# Patient Record
Sex: MALE | Race: WHITE | NOT HISPANIC OR LATINO | Employment: OTHER | URBAN - METROPOLITAN AREA
[De-identification: names, ages, dates, MRNs, and addresses within clinical notes are randomized per-mention and may not be internally consistent; named-entity substitution may affect disease eponyms.]

---

## 2017-04-19 ENCOUNTER — TRANSCRIBE ORDERS (OUTPATIENT)
Dept: ADMINISTRATIVE | Facility: HOSPITAL | Age: 67
End: 2017-04-19

## 2017-04-19 ENCOUNTER — HOSPITAL ENCOUNTER (OUTPATIENT)
Dept: RADIOLOGY | Facility: HOSPITAL | Age: 67
Discharge: HOME | End: 2017-04-19
Attending: INTERNAL MEDICINE
Payer: MEDICARE

## 2017-04-19 DIAGNOSIS — R05.9 COUGH: ICD-10-CM

## 2017-04-19 DIAGNOSIS — R06.00 DYSPNEA, UNSPECIFIED TYPE: Primary | ICD-10-CM

## 2017-04-19 DIAGNOSIS — R06.00 DYSPNEA, UNSPECIFIED TYPE: ICD-10-CM

## 2017-04-19 PROCEDURE — 71020 HB CHEST X-RAY 2VW FRONTAL&LATL: CPT

## 2017-04-24 ENCOUNTER — ALLSCRIPTS OFFICE VISIT (OUTPATIENT)
Dept: OTHER | Facility: OTHER | Age: 67
End: 2017-04-24

## 2017-06-01 ENCOUNTER — ALLSCRIPTS OFFICE VISIT (OUTPATIENT)
Dept: OTHER | Facility: OTHER | Age: 67
End: 2017-06-01

## 2017-10-24 ENCOUNTER — ALLSCRIPTS OFFICE VISIT (OUTPATIENT)
Dept: OTHER | Facility: OTHER | Age: 67
End: 2017-10-24

## 2017-10-25 NOTE — PROGRESS NOTES
Assessment  1  Acute bronchitis (466 0) (J20 9)   2  Cough (786 2) (R05)   3  Allergic rhinitis (477 9) (J30 9)    Plan  Allergic rhinitis    · Follow-up visit in 1 month Evaluation and Treatment  Follow-up  Status: Hold For -  Scheduling  Requested for: 47VWG8403   Ordered; For: Allergic rhinitis; Ordered By: Lisa Orona Performed:  Due: 59UOM7696    Discussion/Summary  Discussion Summary:   The patient has acute bronchitis  It is the cause of the cough  I have suggested to the patient to take Robitussin over-the-counter  I told him I would like to avoid any narcotics like codeine at the time  I have also started him on Breo 100 1 inhalation once a day  I have provided him with samples and showed him how to use the left to device appropriately  I have reminded him that he should rinse his mouth with water after each use  He will continue with the nasal and sinus saline lavaged  He will restart the fluticasone nasal spray 2 sprays to each nostril once a day  If his symptoms worsen or if he develops a fever he will call my office  Otherwise I will see him in 1 month in a follow-up visit  Goals and Barriers: The patient has the current Goals: Get over the acute bronchitis  The patent has the current Barriers: Medications cost  Patient is in the donut hole  Patient's Capacity to Self-Care: Patient is able to Self-Care  Patient agrees and allows to involve family/caregiver in development of care plan:   Patient Education: Educational resources provided:   Medication SE Review and Pt Understands Tx: The treatment plan was reviewed with the patient/guardian  The patient/guardian understands and agrees with the treatment plan      Active Problems  1  Back Pain   2  Benign prostatic hypertrophy (600 00) (N40 0)   3  Chronic cough (786 2) (R05)   4  Chronic rhinitis (472 0) (J31 0)   5  Closed fracture of multiple ribs of left side, sequela (905 1) (S22 42XS)   6   Dysfunction of both eustachian tubes (381 81) (H69 83)   7  Hallux Rigidus Of The Left First Toe (735 2)   8  Hearing Loss (389 9)   9  Hernia (553 9) (K46 9)   10  Hypertension (401 9) (I10)   11  Nephrolithiasis (592 0) (N20 0)   12  Osteoarthritis (715 90) (M19 90)    History of Present Illness  HPI: The patient is here for a sick visit  He was not [place] for about a week and return back about 3 days ago  The 2nd day he developed upper respiratory infection and felt sick  He developed a cough producing clear sputum  Also developed congestion of his sinus and nose  He denied fever or chills  No chest pain  Yesterday his symptoms were worse however today started feeling better  He slept in the recliner for 2 nights  his last visit he stopped using the Symbicort and his hoarseness of the voiced resolved  The cough did not come back after he stopped the Symbicort  He also stopped using the fluticasone nasal spray  However he continue to use the Zyrtec  Review of Systems  Complete-Male Pulm:   Constitutional: No fever or chills, feels well, no tiredness, no recent weight gain or weight loss  Eyes: no complaints of vision problems  ENT: no rhinitis, no PND, no epistaxis  Cardiovascular: no palpitations, no chest pain  Respiratory: as noted in HPI  Gastrointestinal: no complaints of esophageal reflux, no abdominal pain  Genitourinary: no urinary retention  Musculoskeletal: no arthralgias, no joint swelling, no myalgias  Integumentary: no rash, no lesions  Neurological: no headache, no fainting, no weakness  Psychiatric: no anxiety, no depression  Hematologic/Lymphatic: no complaints of swollen glands  Past Medical History  1  History of Back problem (724 9) (M53 9)   2  History of Nephrolithiasis (V13 01)   3  History of Renal cyst, acquired (593 2) (N28 1)    Surgical History  1  History of Cystoscopy With Removal Of Object   2  History of Hernia Repair  Surgical History Reviewed:    The surgical history was reviewed and updated today  Family History  Mother    1  Family history of Heart disease (429 9) (I51 9)   2  Family history of Hypertension (401 9) (I10)  Father    3  Family history of malignant neoplasm (V16 9) (Z80 9)   4  Family history of Heart disease (429 9) (I51 9)   5  Family history of Hypertension (401 9) (I10)  Family History Reviewed: The family history was reviewed and updated today  Social History   · History of Being A Social Drinker   · Denied: Drug use (305 90) (F19 90)   · Former smoker (V15 82) (X75 548)   · Pt stated smoke 1 1/2 packs per day for 20 yrs and quit in [facility] 3970 History Reviewed: The social history was reviewed and updated today  Current Meds   1  Eliquis 5 MG Oral Tablet Recorded   2  Finasteride 5 MG Oral Tablet; Take 1 tablet daily; Therapy: 57OAG6981 to ([facility] [place])  Requested for: 38PGS1711; Last   Rx:17Tej4153 Ordered   3  Losartan Potassium 50 MG Oral Tablet; Take 1 tablet by mouth  daily; Therapy: 32SKH0847 to (Evaluate:12Jan2015)  Requested for: 24BGF5399; Last   Rx:01Wks8128 Ordered   4  Symbicort 160-4 5 MCG/ACT Inhalation Aerosol; INHALE 2 PUFFS TWICE DAILY  RINSE   MOUTH AFTER USE; Therapy: 84CGH4164 to (Last Rx:47Rja9486)  Requested for: 55Lwc8309 Ordered   5  Tamsulosin HCl - 0 4 MG Oral Capsule; Take 1 capsule by mouth  twice a day; Therapy: 32BIB2778 to (Evaluate:12Jan2015)  Requested for: 12SJA1214; Last   Rx:21Nby0202 Ordered  Medication List Reviewed: The medication list was reviewed and updated today  Allergies  1  No Known Drug Allergies    Vitals  Vital Signs    Recorded: 21UQM2698 03:13PM   Temperature 98 8 F   Heart Rate 85   Respiration 16   Systolic 925   Diastolic 80   Height 5 ft 9 in   Weight 250 lb    BMI Calculated 36 92   BSA Calculated 2 27   O2 Saturation 96, RA     Physical Exam    Constitutional   General appearance: No acute distress, well appearing and well nourished      Ears, Nose, Mouth, and Throat   Nasal mucosa, septum, and turbinates: Normal without edema or erythema  Lips, teeth, and gums: Normal, good dentition  Oropharynx: Normal with no erythema, edema, exudate or lesions  Neck   Neck: Supple, symmetric, trachea midline, no masses  Jugular veins: Normal     Pulmonary   Auscultation of lungs: Abnormal  -- Coarse breath sounds  No wheezing  Cardiovascular   Auscultation of heart: Normal rate and rhythm, normal S1 and S2, no murmurs  Examination of extremities for edema and/or varicosities: Normal     Abdomen   Abdomen: Soft, non-tender  Lymphatic   Palpation of lymph nodes in neck: No lymphadenopathy  Musculoskeletal   Gait and station: Normal     Digits and nails: Normal without clubbing or cyanosis  Neurologic   Mental Status: Normal  Not confused, no evidence of dementia, good comprehension, good concentration  Skin   Skin and subcutaneous tissue: Limited exam shows no rash  Psychiatric   Orientation to person, place and time: Normal     Mood and affect: Normal        Thank you very much for allowing me to participate in the care of this patient  If you have any questions, please do not hesitate to contact me  Future Appointments    Date/Time Provider Specialty Site   11/14/2017 01:20 PM Eliz Guerrero MD Pulmonary Medicine [facility]     Signatures   Electronically signed by :  Tyree Huff MD; Oct 24 2017  4:45PM EST                       (Author)

## 2017-11-07 ENCOUNTER — GENERIC CONVERSION - ENCOUNTER (OUTPATIENT)
Dept: OTHER | Facility: OTHER | Age: 67
End: 2017-11-07

## 2017-11-14 ENCOUNTER — ALLSCRIPTS OFFICE VISIT (OUTPATIENT)
Dept: OTHER | Facility: OTHER | Age: 67
End: 2017-11-14

## 2017-11-14 ENCOUNTER — GENERIC CONVERSION - ENCOUNTER (OUTPATIENT)
Dept: OTHER | Facility: OTHER | Age: 67
End: 2017-11-14

## 2018-01-13 VITALS
HEIGHT: 69 IN | BODY MASS INDEX: 37.03 KG/M2 | WEIGHT: 250 LBS | SYSTOLIC BLOOD PRESSURE: 140 MMHG | HEART RATE: 85 BPM | DIASTOLIC BLOOD PRESSURE: 80 MMHG | TEMPERATURE: 98.8 F | RESPIRATION RATE: 16 BRPM | OXYGEN SATURATION: 96 %

## 2018-01-14 VITALS
HEART RATE: 90 BPM | SYSTOLIC BLOOD PRESSURE: 148 MMHG | BODY MASS INDEX: 36.43 KG/M2 | DIASTOLIC BLOOD PRESSURE: 90 MMHG | RESPIRATION RATE: 18 BRPM | TEMPERATURE: 97.7 F | HEIGHT: 69 IN | WEIGHT: 246 LBS | OXYGEN SATURATION: 95 %

## 2018-01-15 VITALS
SYSTOLIC BLOOD PRESSURE: 120 MMHG | HEART RATE: 80 BPM | HEIGHT: 69 IN | DIASTOLIC BLOOD PRESSURE: 72 MMHG | OXYGEN SATURATION: 95 % | RESPIRATION RATE: 16 BRPM | WEIGHT: 226 LBS | TEMPERATURE: 97.9 F | BODY MASS INDEX: 33.47 KG/M2

## 2018-01-22 VITALS
RESPIRATION RATE: 18 BRPM | OXYGEN SATURATION: 95 % | TEMPERATURE: 97.6 F | HEIGHT: 69 IN | WEIGHT: 244 LBS | BODY MASS INDEX: 36.14 KG/M2 | SYSTOLIC BLOOD PRESSURE: 170 MMHG | DIASTOLIC BLOOD PRESSURE: 90 MMHG | HEART RATE: 110 BPM

## 2018-01-22 VITALS
RESPIRATION RATE: 18 BRPM | TEMPERATURE: 98.2 F | OXYGEN SATURATION: 94 % | WEIGHT: 248.13 LBS | HEIGHT: 69 IN | HEART RATE: 92 BPM | BODY MASS INDEX: 36.75 KG/M2 | SYSTOLIC BLOOD PRESSURE: 160 MMHG | DIASTOLIC BLOOD PRESSURE: 98 MMHG

## 2018-03-19 DIAGNOSIS — R05.9 COUGH: Primary | ICD-10-CM

## 2018-03-19 NOTE — TELEPHONE ENCOUNTER
Pt called requesting refills for Breo to be sent to [facility] x 90days  The medicine is not in his Med list? Please check w/ Dr Karina Jewell if patient should follow up since on his last visit Dr Karina Jewell mentioned pt should stop Breo  Pt is aware he might need an appt

## 2018-03-22 RX ORDER — FLUTICASONE FUROATE AND VILANTEROL 100; 25 UG/1; UG/1
1 POWDER RESPIRATORY (INHALATION) DAILY
Qty: 3 EACH | Refills: 3 | Status: SHIPPED | OUTPATIENT
Start: 2018-03-22 | End: 2019-03-22

## 2018-04-25 ENCOUNTER — HOSPITAL ENCOUNTER (OUTPATIENT)
Facility: HOSPITAL | Age: 68
Setting detail: OBSERVATION
Discharge: HOME | End: 2018-04-26
Attending: EMERGENCY MEDICINE | Admitting: INTERNAL MEDICINE
Payer: MEDICARE

## 2018-04-25 ENCOUNTER — APPOINTMENT (EMERGENCY)
Dept: RADIOLOGY | Facility: HOSPITAL | Age: 68
End: 2018-04-25
Payer: MEDICARE

## 2018-04-25 ENCOUNTER — APPOINTMENT (OUTPATIENT)
Dept: CT IMAGING | Facility: HOSPITAL | Age: 68
End: 2018-04-25
Payer: MEDICARE

## 2018-04-25 DIAGNOSIS — F51.01 PRIMARY INSOMNIA: ICD-10-CM

## 2018-04-25 DIAGNOSIS — I10 UNCONTROLLED HYPERTENSION: Primary | ICD-10-CM

## 2018-04-25 PROBLEM — D68.51 FACTOR V LEIDEN MUTATION (HCC): Chronic | Status: ACTIVE | Noted: 2018-04-25

## 2018-04-25 PROBLEM — Z79.01 CURRENT USE OF LONG TERM ANTICOAGULATION: Chronic | Status: ACTIVE | Noted: 2018-04-25

## 2018-04-25 PROBLEM — K46.9 ABDOMINAL HERNIA: Status: ACTIVE | Noted: 2018-04-25

## 2018-04-25 LAB
ALBUMIN SERPL BCP-MCNC: 3.7 G/DL (ref 3.5–5)
ALP SERPL-CCNC: 120 U/L (ref 46–116)
ALT SERPL W P-5'-P-CCNC: 36 U/L (ref 12–78)
ANION GAP SERPL CALCULATED.3IONS-SCNC: 7 MMOL/L (ref 4–13)
AST SERPL W P-5'-P-CCNC: 23 U/L (ref 5–45)
ATRIAL RATE: 79 BPM
BACTERIA UR QL AUTO: ABNORMAL /HPF
BASOPHILS # BLD AUTO: 0.04 THOUSANDS/ΜL (ref 0–0.1)
BASOPHILS NFR BLD AUTO: 0 % (ref 0–1)
BILIRUB SERPL-MCNC: 0.47 MG/DL (ref 0.2–1)
BILIRUB UR QL STRIP: NEGATIVE
BUN SERPL-MCNC: 18 MG/DL (ref 5–25)
CALCIUM SERPL-MCNC: 9.8 MG/DL (ref 8.3–10.1)
CHLORIDE SERPL-SCNC: 104 MMOL/L (ref 100–108)
CLARITY UR: CLEAR
CO2 SERPL-SCNC: 29 MMOL/L (ref 21–32)
COLOR UR: YELLOW
CREAT SERPL-MCNC: 0.97 MG/DL (ref 0.6–1.3)
EOSINOPHIL # BLD AUTO: 0.36 THOUSAND/ΜL (ref 0–0.61)
EOSINOPHIL NFR BLD AUTO: 3 % (ref 0–6)
ERYTHROCYTE [DISTWIDTH] IN BLOOD BY AUTOMATED COUNT: 13.8 % (ref 11.6–15.1)
GFR SERPL CREATININE-BSD FRML MDRD: 79 ML/MIN/1.73SQ M
GLUCOSE SERPL-MCNC: 93 MG/DL (ref 65–140)
GLUCOSE UR STRIP-MCNC: NEGATIVE MG/DL
HCT VFR BLD AUTO: 49.4 % (ref 36.5–49.3)
HGB BLD-MCNC: 16.4 G/DL (ref 12–17)
HGB UR QL STRIP.AUTO: ABNORMAL
KETONES UR STRIP-MCNC: NEGATIVE MG/DL
LEUKOCYTE ESTERASE UR QL STRIP: ABNORMAL
LYMPHOCYTES # BLD AUTO: 2.03 THOUSANDS/ΜL (ref 0.6–4.47)
LYMPHOCYTES NFR BLD AUTO: 19 % (ref 14–44)
MAGNESIUM SERPL-MCNC: 2 MG/DL (ref 1.6–2.6)
MCH RBC QN AUTO: 31.2 PG (ref 26.8–34.3)
MCHC RBC AUTO-ENTMCNC: 33.2 G/DL (ref 31.4–37.4)
MCV RBC AUTO: 94 FL (ref 82–98)
MONOCYTES # BLD AUTO: 0.78 THOUSAND/ΜL (ref 0.17–1.22)
MONOCYTES NFR BLD AUTO: 8 % (ref 4–12)
NEUTROPHILS # BLD AUTO: 7.23 THOUSANDS/ΜL (ref 1.85–7.62)
NEUTS SEG NFR BLD AUTO: 70 % (ref 43–75)
NITRITE UR QL STRIP: NEGATIVE
NON-SQ EPI CELLS URNS QL MICRO: ABNORMAL /HPF
NRBC BLD AUTO-RTO: 0 /100 WBCS
P AXIS: 40 DEGREES
PH UR STRIP.AUTO: 7 [PH] (ref 4.5–8)
PLATELET # BLD AUTO: 265 THOUSANDS/UL (ref 149–390)
PMV BLD AUTO: 9.9 FL (ref 8.9–12.7)
POTASSIUM SERPL-SCNC: 4.3 MMOL/L (ref 3.5–5.3)
PR INTERVAL: 156 MS
PROT SERPL-MCNC: 7.7 G/DL (ref 6.4–8.2)
PROT UR STRIP-MCNC: NEGATIVE MG/DL
QRS AXIS: -28 DEGREES
QRSD INTERVAL: 90 MS
QT INTERVAL: 364 MS
QTC INTERVAL: 417 MS
RBC # BLD AUTO: 5.26 MILLION/UL (ref 3.88–5.62)
RBC #/AREA URNS AUTO: ABNORMAL /HPF
SODIUM SERPL-SCNC: 140 MMOL/L (ref 136–145)
SP GR UR STRIP.AUTO: 1.01 (ref 1–1.03)
T WAVE AXIS: 35 DEGREES
TROPONIN I SERPL-MCNC: <0.02 NG/ML
TSH SERPL DL<=0.05 MIU/L-ACNC: 5.52 UIU/ML (ref 0.36–3.74)
UROBILINOGEN UR QL STRIP.AUTO: 0.2 E.U./DL
VENTRICULAR RATE: 79 BPM
WBC # BLD AUTO: 10.44 THOUSAND/UL (ref 4.31–10.16)
WBC #/AREA URNS AUTO: ABNORMAL /HPF

## 2018-04-25 PROCEDURE — 80053 COMPREHEN METABOLIC PANEL: CPT | Performed by: EMERGENCY MEDICINE

## 2018-04-25 PROCEDURE — 36415 COLL VENOUS BLD VENIPUNCTURE: CPT | Performed by: EMERGENCY MEDICINE

## 2018-04-25 PROCEDURE — 96374 THER/PROPH/DIAG INJ IV PUSH: CPT

## 2018-04-25 PROCEDURE — 99284 EMERGENCY DEPT VISIT MOD MDM: CPT

## 2018-04-25 PROCEDURE — 83735 ASSAY OF MAGNESIUM: CPT | Performed by: EMERGENCY MEDICINE

## 2018-04-25 PROCEDURE — 70450 CT HEAD/BRAIN W/O DYE: CPT

## 2018-04-25 PROCEDURE — 96361 HYDRATE IV INFUSION ADD-ON: CPT

## 2018-04-25 PROCEDURE — 81002 URINALYSIS NONAUTO W/O SCOPE: CPT | Performed by: EMERGENCY MEDICINE

## 2018-04-25 PROCEDURE — 81001 URINALYSIS AUTO W/SCOPE: CPT

## 2018-04-25 PROCEDURE — 85025 COMPLETE CBC W/AUTO DIFF WBC: CPT | Performed by: EMERGENCY MEDICINE

## 2018-04-25 PROCEDURE — 84443 ASSAY THYROID STIM HORMONE: CPT | Performed by: EMERGENCY MEDICINE

## 2018-04-25 PROCEDURE — 96375 TX/PRO/DX INJ NEW DRUG ADDON: CPT

## 2018-04-25 PROCEDURE — 84484 ASSAY OF TROPONIN QUANT: CPT | Performed by: EMERGENCY MEDICINE

## 2018-04-25 PROCEDURE — 93010 ELECTROCARDIOGRAM REPORT: CPT | Performed by: INTERNAL MEDICINE

## 2018-04-25 PROCEDURE — 93005 ELECTROCARDIOGRAM TRACING: CPT

## 2018-04-25 PROCEDURE — 71046 X-RAY EXAM CHEST 2 VIEWS: CPT

## 2018-04-25 RX ORDER — LOSARTAN POTASSIUM 50 MG/1
100 TABLET ORAL DAILY
Status: DISCONTINUED | OUTPATIENT
Start: 2018-04-26 | End: 2018-04-26 | Stop reason: HOSPADM

## 2018-04-25 RX ORDER — VALACYCLOVIR HYDROCHLORIDE 500 MG/1
1000 TABLET, FILM COATED ORAL 3 TIMES DAILY
Status: DISCONTINUED | OUTPATIENT
Start: 2018-04-25 | End: 2018-04-26 | Stop reason: HOSPADM

## 2018-04-25 RX ORDER — FLUTICASONE FUROATE AND VILANTEROL 100; 25 UG/1; UG/1
1 POWDER RESPIRATORY (INHALATION) DAILY
Status: DISCONTINUED | OUTPATIENT
Start: 2018-04-26 | End: 2018-04-26 | Stop reason: HOSPADM

## 2018-04-25 RX ORDER — LORAZEPAM 0.5 MG/1
0.5 TABLET ORAL EVERY 8 HOURS PRN
Status: DISCONTINUED | OUTPATIENT
Start: 2018-04-25 | End: 2018-04-26 | Stop reason: HOSPADM

## 2018-04-25 RX ORDER — LABETALOL HYDROCHLORIDE 5 MG/ML
10 INJECTION, SOLUTION INTRAVENOUS ONCE
Status: COMPLETED | OUTPATIENT
Start: 2018-04-25 | End: 2018-04-25

## 2018-04-25 RX ORDER — TAMSULOSIN HYDROCHLORIDE 0.4 MG/1
0.4 CAPSULE ORAL DAILY
Status: DISCONTINUED | OUTPATIENT
Start: 2018-04-25 | End: 2018-04-26 | Stop reason: HOSPADM

## 2018-04-25 RX ORDER — HYDRALAZINE HYDROCHLORIDE 20 MG/ML
5 INJECTION INTRAMUSCULAR; INTRAVENOUS EVERY 4 HOURS PRN
Status: DISCONTINUED | OUTPATIENT
Start: 2018-04-25 | End: 2018-04-26 | Stop reason: HOSPADM

## 2018-04-25 RX ORDER — CHLORTHALIDONE 25 MG/1
25 TABLET ORAL DAILY
Status: DISCONTINUED | OUTPATIENT
Start: 2018-04-25 | End: 2018-04-26 | Stop reason: HOSPADM

## 2018-04-25 RX ORDER — HYDRALAZINE HYDROCHLORIDE 20 MG/ML
10 INJECTION INTRAMUSCULAR; INTRAVENOUS ONCE
Status: COMPLETED | OUTPATIENT
Start: 2018-04-25 | End: 2018-04-25

## 2018-04-25 RX ORDER — FINASTERIDE 5 MG/1
5 TABLET, FILM COATED ORAL DAILY
Status: DISCONTINUED | OUTPATIENT
Start: 2018-04-25 | End: 2018-04-26 | Stop reason: HOSPADM

## 2018-04-25 RX ORDER — VALACYCLOVIR HYDROCHLORIDE 1 G/1
1000 TABLET, FILM COATED ORAL 3 TIMES DAILY
COMMUNITY
End: 2019-03-22

## 2018-04-25 RX ORDER — LOSARTAN POTASSIUM 50 MG/1
50 TABLET ORAL DAILY
Status: DISCONTINUED | OUTPATIENT
Start: 2018-04-26 | End: 2018-04-25

## 2018-04-25 RX ADMIN — TAMSULOSIN HYDROCHLORIDE 0.4 MG: 0.4 CAPSULE ORAL at 23:00

## 2018-04-25 RX ADMIN — CHLORTHALIDONE 25 MG: 25 TABLET ORAL at 22:54

## 2018-04-25 RX ADMIN — SODIUM CHLORIDE 1000 ML: 0.9 INJECTION, SOLUTION INTRAVENOUS at 18:22

## 2018-04-25 RX ADMIN — VALACYCLOVIR HYDROCHLORIDE 1000 MG: 500 TABLET, FILM COATED ORAL at 22:54

## 2018-04-25 RX ADMIN — APIXABAN 5 MG: 5 TABLET, FILM COATED ORAL at 22:54

## 2018-04-25 RX ADMIN — FINASTERIDE 5 MG: 5 TABLET, FILM COATED ORAL at 23:42

## 2018-04-25 RX ADMIN — HYDRALAZINE HYDROCHLORIDE 10 MG: 20 INJECTION INTRAMUSCULAR; INTRAVENOUS at 18:27

## 2018-04-25 RX ADMIN — LORAZEPAM 0.5 MG: 0.5 TABLET ORAL at 23:00

## 2018-04-25 RX ADMIN — LABETALOL 20 MG/4 ML (5 MG/ML) INTRAVENOUS SYRINGE 10 MG: at 19:24

## 2018-04-25 NOTE — ED PROVIDER NOTES
History  Chief Complaint   Patient presents with    High Blood Pressure     pt reports he was at PCP for another reason, had high blood pressure at the office was instructed to take additional dose of Losartan-reports hypertension continues  denies chest pain     69 YO male presents with high blood pressure today  Pt states he was seeing his PCP for a rash, notes to have an elevated blood pressure in the office  Pt take Valsartan for his pressure, was told to double his dose  Pt went home and did take an extra Valsartan but states his pressure continued to elevated throughout the evening  Pt denies associated lightheadedness, headaches, chest pain, shortness of breath  States he has felt fatigue for ~2 weeks  Pt denies similar issues with his pressure before  Pt denies CP/SOB/F/C/N/V/D/C, no dysuria, burning on urination or blood in urine  History provided by:  Patient   used: No    Hypertension   Severity:  Mild  Onset quality:  Unable to specify  Duration:  1 day  Timing:  Constant  Progression:  Worsening  Chronicity:  New  Relieved by:  Nothing  Worsened by:  Nothing  Associated symptoms: fatigue    Associated symptoms: no abdominal pain, no anxiety, no blurred vision, no chest pain, no confusion, no dizziness, no fever, no headaches, no nausea, no neck pain, no shortness of breath, no tinnitus, not vomiting and no weakness    Fatigue:     Severity:  Moderate    Duration:  2 weeks    Timing:  Constant    Progression:  Waxing and waning      Prior to Admission Medications   Prescriptions Last Dose Informant Patient Reported? Taking? LORazepam (ATIVAN) 0 5 mg tablet   Yes Yes   Sig: Take 0 5 mg by mouth 2 (two) times a day as needed     Naproxen Sodium (ALEVE PO)   Yes Yes   Sig: Take 1 tablet by mouth 2 (two) times a day as needed   apixaban (ELIQUIS) 5 mg   No Yes   Sig: 10 mg orally twice daily for 7 days and then 5 mg orally twice daily     Patient taking differently: 5 mg 2 (two) times a day     finasteride (PROSCAR) 5 mg tablet   Yes Yes   Sig: Take 5 mg by mouth daily  fluticasone furoate-vilanterol (BREO ELLIPTA)   No Yes   Sig: Inhale 1 puff daily   losartan (COZAAR) 50 mg tablet   Yes Yes   Sig: Take 50 mg by mouth daily  tamsulosin (FLOMAX) 0 4 mg   Yes Yes   Sig: Take 0 4 mg by mouth daily  valACYclovir (VALTREX) 1,000 mg tablet   Yes Yes   Sig: Take 1,000 mg by mouth 3 (three) times a day      Facility-Administered Medications: None       Past Medical History:   Diagnosis Date    Allergic rhinitis     Arthritis     COPD, mild (HCC)     GERD (gastroesophageal reflux disease)     History of DVT (deep vein thrombosis)     Hypertension     Kidney stones     Nephrolithiasis     Obesity (BMI 30-39  9)     Osteoarthritis     PE (pulmonary embolism) 12/26/1999    Pneumonia 1/13/2016    Prostate hypertrophy     Rib pain     7,8,9 ribs on Left chest wall with Lsided hernia    Wears glasses     reading       Past Surgical History:   Procedure Laterality Date    HERNIA REPAIR      b/l inguinal hernia with mesh    KIDNEY STONE SURGERY      KNEE ARTHROPLASTY Left     TKR    MI KNEE SCOPE,SHAVE ARTICULAR CART Right 11/8/2016    Procedure: ARTHROSCOPY KNEE WITH PARTIAL MEDIAL MENISECTOMY;  Surgeon: Carl Chi MD;  [facility];  Service: Orthopedics       Family History   Problem Relation Age of Onset    Heart disease Mother     Cancer Mother     Cancer Father      I have reviewed and agree with the history as documented  Social History   Substance Use Topics    Smoking status: Former Smoker     Packs/day: 1 00     Years: 20 00     Types: Cigarettes     Quit date: 1995    Smokeless tobacco: Never Used    Alcohol use Yes      Comment: 1 daily        Review of Systems   Constitutional: Positive for fatigue  Negative for fever  HENT: Negative for dental problem and tinnitus  Eyes: Negative for blurred vision and visual disturbance     Respiratory: Negative for shortness of breath  Cardiovascular: Negative for chest pain  Gastrointestinal: Negative for abdominal pain, nausea and vomiting  Genitourinary: Negative for dysuria and frequency  Musculoskeletal: Negative for neck pain and neck stiffness  Skin: Negative for rash  Neurological: Negative for dizziness, weakness, light-headedness and headaches  Psychiatric/Behavioral: Negative for agitation, behavioral problems and confusion  The patient is not nervous/anxious  All other systems reviewed and are negative  Physical Exam  ED Triage Vitals [04/25/18 1715]   Temperature Pulse Respirations Blood Pressure SpO2   97 5 °F (36 4 °C) 91 18 (!) 199/103 96 %      Temp Source Heart Rate Source Patient Position - Orthostatic VS BP Location FiO2 (%)   Temporal Monitor Sitting Right arm --      Pain Score       No Pain           Orthostatic Vital Signs  Vitals:    04/25/18 1912 04/25/18 1915 04/25/18 1930 04/25/18 1945   BP: (!) 180/103 (!) 195/99 (!) 189/99 (!) 186/99   Pulse: 83 82 82 86   Patient Position - Orthostatic VS: Sitting  Lying Lying       Physical Exam   Constitutional: He is oriented to person, place, and time  He appears well-developed and well-nourished  HENT:   Head: Normocephalic and atraumatic  Eyes: EOM are normal    Neck: Normal range of motion  Cardiovascular: Normal rate, regular rhythm and normal heart sounds  Pulmonary/Chest: Effort normal and breath sounds normal    Abdominal: Soft  There is no tenderness  Musculoskeletal: Normal range of motion  Neurological: He is alert and oriented to person, place, and time  Skin: Skin is warm and dry  Psychiatric: He has a normal mood and affect  His behavior is normal    Nursing note and vitals reviewed        ED Medications  Medications   sodium chloride 0 9 % bolus 1,000 mL (1,000 mL Intravenous New Bag 4/25/18 1822)   hydrALAZINE (APRESOLINE) injection 10 mg (10 mg Intravenous Given 4/25/18 1827)   labetalol (NORMODYNE) injection 10 mg (10 mg Intravenous Given 4/25/18 1924)       Diagnostic Studies  Results Reviewed     Procedure Component Value Units Date/Time    Urine Microscopic [[id number]]  (Abnormal) Collected:  04/25/18 1912    Lab Status:  Final result Specimen:  Urine from Urine, Clean Catch Updated:  04/25/18 1937     RBC, UA 0-1 (A) /hpf      WBC, UA 0-1 (A) /hpf      Epithelial Cells Occasional /hpf      Bacteria, UA Occasional /hpf     POCT urinalysis dipstick [[id number]]  (Abnormal) Resulted:  04/25/18 1912    Lab Status:  Final result Specimen:  Urine Updated:  04/25/18 1912    ED Urine Macroscopic [[id number]]  (Abnormal) Collected:  04/25/18 1912    Lab Status:  Final result Specimen:  Urine Updated:  04/25/18 1911     Color, UA Yellow     Clarity, UA Clear     pH, UA 7 0     Leukocytes, UA Trace (A)     Nitrite, UA Negative     Protein, UA Negative mg/dl      Glucose, UA Negative mg/dl      Ketones, UA Negative mg/dl      Urobilinogen, UA 0 2 E U /dl      Bilirubin, UA Negative     Blood, UA Trace (A)     Specific [place], UA 1 015    Narrative:       CLINITEK RESULT    Comprehensive metabolic panel [[id number]]  (Abnormal) Collected:  04/25/18 1823    Lab Status:  Final result Specimen:  Blood from Arm, Left Updated:  04/25/18 1905     Sodium 140 mmol/L      Potassium 4 3 mmol/L      Chloride 104 mmol/L      CO2 29 mmol/L      Anion Gap 7 mmol/L      BUN 18 mg/dL      Creatinine 0 97 mg/dL      Glucose 93 mg/dL      Calcium 9 8 mg/dL      AST 23 U/L      ALT 36 U/L      Alkaline Phosphatase 120 (H) U/L      Total Protein 7 7 g/dL      Albumin 3 7 g/dL      Total Bilirubin 0 47 mg/dL      eGFR 79 ml/min/1 73sq m     Narrative:         National Kidney Disease Education Program recommendations are as follows:  GFR calculation is accurate only with a steady state creatinine  Chronic Kidney disease less than 60 ml/min/1 73 sq  meters  Kidney failure less than 15 ml/min/1 73 sq  meters      TSH [[id number]] (Abnormal) Collected:  04/25/18 1823    Lab Status:  Final result Specimen:  Blood from Arm, Left Updated:  04/25/18 1905     TSH 3RD GENERATON 5 524 (H) uIU/mL     Narrative:         Patients undergoing fluorescein dye angiography may retain small amounts of fluorescein in the body for 48-72 hours post procedure  Samples containing fluorescein can produce falsely depressed TSH values  If the patient had this procedure,a specimen should be resubmitted post fluorescein clearance  Magnesium [[id number]]  (Normal) Collected:  04/25/18 1823    Lab Status:  Final result Specimen:  Blood from Arm, Left Updated:  04/25/18 1905     Magnesium 2 0 mg/dL     Troponin I [[id number]]  (Normal) Collected:  04/25/18 1823    Lab Status:  Final result Specimen:  Blood from Arm, Left Updated:  04/25/18 1857     Troponin I <0 02 ng/mL     Narrative:         [employer] Chemistry analyzer 99% cutoff is > 0 04 ng/mL in network labs    o cTnI 99% cutoff is useful only when applied to patients in the clinical setting of myocardial ischemia  o cTnI 99% cutoff should be interpreted in the context of clinical history, ECG findings and possibly cardiac imaging to establish correct diagnosis  o cTnI 99% cutoff may be suggestive but clearly not indicative of a coronary event without the clinical setting of myocardial ischemia      CBC and differential [[id number]]  (Abnormal) Collected:  04/25/18 1823    Lab Status:  Final result Specimen:  Blood from Arm, Left Updated:  04/25/18 1837     WBC 10 44 (H) Thousand/uL      RBC 5 26 Million/uL      Hemoglobin 16 4 g/dL      Hematocrit 49 4 (H) %      MCV 94 fL      MCH 31 2 pg      MCHC 33 2 g/dL      RDW 13 8 %      MPV 9 9 fL      Platelets 548 Thousands/uL      nRBC 0 /100 WBCs      Neutrophils Relative 70 %      Lymphocytes Relative 19 %      Monocytes Relative 8 %      Eosinophils Relative 3 %      Basophils Relative 0 %      Neutrophils Absolute 7 23 Thousands/µL      Lymphocytes Absolute 2 03 Thousands/µL      Monocytes Absolute 0 78 Thousand/µL      Eosinophils Absolute 0 36 Thousand/µL      Basophils Absolute 0 04 Thousands/µL                  XR chest 2 views   Final Result by Rahat Doe MD (04/25 1901)      No acute cardiopulmonary disease  Workstation performed: [id number]                    Procedures  ECG 12 Lead Documentation  Date/Time: 4/25/2018 7:59 PM  Performed by: Nuria Medina  Authorized by: Aidee CASTILLO     ECG reviewed by me, the ED Provider: yes    Patient location:  ED  Interpretation:     Interpretation: normal    Rate:     ECG rate:  79    ECG rate assessment: normal    Rhythm:     Rhythm: sinus rhythm    QRS:     QRS axis:  Normal    QRS intervals:  Normal  Conduction:     Conduction: normal    ST segments:     ST segments:  Normal  T waves:     T waves: normal             Phone Contacts  ED Phone Contact    ED Course  ED Course as of Apr 25 1959 Wed Apr 25, 2018 1821 Spoke with Dr Joy Phillips, pt's PCP, explaining pt's presentation to the ED  Recommends hydralazine and monitoring, can likely be discharged to follow up tomorrow  MDM  Number of Diagnoses or Management Options  Uncontrolled hypertension: new and requires workup  Diagnosis management comments: 1  High blood pressure - Pt with elevated pressure, currently taking Valsartan for this, not improved after doubling his dose  Will check ECG for ischemia, electrolytes for kidney issues, CBC  Will give Hydralazine, speak with PCP         Amount and/or Complexity of Data Reviewed  Clinical lab tests: ordered and reviewed  Tests in the radiology section of CPT®: ordered and reviewed  Obtain history from someone other than the patient: yes  Discuss the patient with other providers: yes  Independent visualization of images, tracings, or specimens: yes    Patient Progress  Patient progress: stable    CritCare Time    Disposition  Final diagnoses:   Uncontrolled hypertension Time reflects when diagnosis was documented in both MDM as applicable and the Disposition within this note     Time User Action Codes Description Comment    4/25/2018  7:55 PM [place], [address] Uncontrolled hypertension       ED Disposition     ED Disposition Condition Comment    Admit  Case was discussed with Dr Sandra Ortega and the patient's admission status was agreed to be Admission Status: observation status to the service of Dr Sandra Ortega   Follow-up Information    None       Patient's Medications   Discharge Prescriptions    No medications on file     No discharge procedures on file      ED Provider  Electronically Signed by           Burt Tracey MD  04/25/18 5908

## 2018-04-26 ENCOUNTER — HOSPITAL ENCOUNTER (EMERGENCY)
Facility: HOSPITAL | Age: 68
Discharge: HOME | End: 2018-04-26
Attending: EMERGENCY MEDICINE | Admitting: EMERGENCY MEDICINE
Payer: MEDICARE

## 2018-04-26 VITALS
OXYGEN SATURATION: 95 % | DIASTOLIC BLOOD PRESSURE: 96 MMHG | WEIGHT: 256 LBS | TEMPERATURE: 98 F | BODY MASS INDEX: 37.92 KG/M2 | RESPIRATION RATE: 18 BRPM | HEIGHT: 69 IN | SYSTOLIC BLOOD PRESSURE: 156 MMHG | HEART RATE: 84 BPM

## 2018-04-26 VITALS
TEMPERATURE: 98.4 F | SYSTOLIC BLOOD PRESSURE: 172 MMHG | OXYGEN SATURATION: 97 % | DIASTOLIC BLOOD PRESSURE: 88 MMHG | HEART RATE: 100 BPM | WEIGHT: 255.73 LBS | BODY MASS INDEX: 37.77 KG/M2 | RESPIRATION RATE: 18 BRPM

## 2018-04-26 DIAGNOSIS — I10 HYPERTENSION: Primary | ICD-10-CM

## 2018-04-26 LAB
ATRIAL RATE: 106 BPM
P AXIS: 32 DEGREES
PR INTERVAL: 160 MS
QRS AXIS: -44 DEGREES
QRSD INTERVAL: 88 MS
QT INTERVAL: 322 MS
QTC INTERVAL: 427 MS
T WAVE AXIS: 12 DEGREES
VENTRICULAR RATE: 106 BPM

## 2018-04-26 PROCEDURE — 99283 EMERGENCY DEPT VISIT LOW MDM: CPT

## 2018-04-26 PROCEDURE — 93010 ELECTROCARDIOGRAM REPORT: CPT | Performed by: INTERNAL MEDICINE

## 2018-04-26 PROCEDURE — 93005 ELECTROCARDIOGRAM TRACING: CPT

## 2018-04-26 RX ORDER — CLONIDINE HYDROCHLORIDE 0.1 MG/1
0.1 TABLET ORAL ONCE
Status: COMPLETED | OUTPATIENT
Start: 2018-04-26 | End: 2018-04-26

## 2018-04-26 RX ORDER — CHLORTHALIDONE 25 MG/1
25 TABLET ORAL DAILY
Qty: 90 TABLET | Refills: 1 | Status: SHIPPED | OUTPATIENT
Start: 2018-04-27 | End: 2022-07-28 | Stop reason: HOSPADM

## 2018-04-26 RX ORDER — LORAZEPAM 0.5 MG/1
0.5 TABLET ORAL
Qty: 7 TABLET | Refills: 0 | Status: SHIPPED | OUTPATIENT
Start: 2018-04-26 | End: 2019-03-22

## 2018-04-26 RX ORDER — LOSARTAN POTASSIUM 100 MG/1
100 TABLET ORAL DAILY
Qty: 90 TABLET | Refills: 1 | Status: SHIPPED | OUTPATIENT
Start: 2018-04-27

## 2018-04-26 RX ADMIN — APIXABAN 5 MG: 5 TABLET, FILM COATED ORAL at 08:20

## 2018-04-26 RX ADMIN — CHLORTHALIDONE 25 MG: 25 TABLET ORAL at 08:20

## 2018-04-26 RX ADMIN — CLONIDINE HYDROCHLORIDE 0.1 MG: 0.1 TABLET ORAL at 22:20

## 2018-04-26 RX ADMIN — LOSARTAN POTASSIUM 100 MG: 50 TABLET, FILM COATED ORAL at 08:20

## 2018-04-26 RX ADMIN — VALACYCLOVIR HYDROCHLORIDE 1000 MG: 500 TABLET, FILM COATED ORAL at 08:20

## 2018-04-26 NOTE — DISCHARGE INSTRUCTIONS
DASH Eating Plan   WHAT YOU NEED TO KNOW:   The DASH (Dietary Approaches to Stop Hypertension) Eating Plan is designed to help prevent or lower high blood pressure  It can also help to lower LDL (bad) cholesterol and decrease your risk of heart disease  The plan is low in sodium, sugar, unhealthy fats, and total fat  It is high in potassium, calcium, magnesium, and fiber  These nutrients are added when you eat more fruits, vegetables, and whole grains  DISCHARGE INSTRUCTIONS:   Your sodium limit each day: Your dietitian will tell you how much sodium is safe for you to have each day  People with high blood pressure should have no more than 1,500 to 2,300 mg of sodium in a day  A teaspoon (tsp) of salt has 2,300 mg of sodium  This may seem like a difficult goal, but small changes to the foods you eat can make a big difference  Your healthcare provider or dietitian can help you create a meal plan that follows your sodium limit  How to limit sodium:   · Read food labels  Food labels can help you choose foods that are low in sodium  The amount of sodium is listed in milligrams (mg)  The % Daily Value (DV) column tells you how much of your daily needs are met by 1 serving of the food for each nutrient listed  Choose foods that have less than 5% of the DV of sodium  These foods are considered low in sodium  Foods that have 20% or more of the DV of sodium are considered high in sodium  Avoid foods that have more than 300 mg of sodium in each serving  Choose foods that say low-sodium, reduced-sodium, or no salt added on the food label  · Avoid salt  Do not salt food at the table, and add very little salt to foods during cooking  Use herbs and spices, such as onions, garlic, and salt-free seasonings to add flavor to foods  Try lemon or lime juice or vinegar to give foods a tart flavor  Use hot peppers or a small amount of hot pepper sauce to add a spicy flavor to foods       · Ask about salt substitutes  Ask your healthcare provider if you may use salt substitutes  Some salt substitutes have ingredients that can be harmful if you have certain health conditions  · Choose foods carefully at restaurants  Meals from restaurants, especially fast food restaurants, are often high in sodium  Some restaurants have nutrition information that tells you the amount of sodium in their foods  Ask to have your food prepared with less, or no salt  What you need to know about fats:   · Include healthy fats  Examples are unsaturated fats and omega-3 fatty acids  Unsaturated fats are found in soybean, canola, olive, or sunflower oil, and liquid and soft tub margarines  Omega-3 fatty acids are found in fatty fish, such as salmon, tuna, mackerel, and sardines  It is also found in flaxseed oil and ground flaxseed  · Avoid unhealthy fats  Do not eat unhealthy fats, such as saturated fats and trans fats  Saturated fats are found in foods that contain fat from animals  Examples are fatty meats, whole milk, butter, cream, and other dairy foods  It is also found in shortening, stick margarine, palm oil, and coconut oil  Trans fats are found in fried foods, crackers, chips, and baked goods made with margarine or shortening  Foods to include: With the DASH eating plan, you need to eat a certain number of servings from each food group  This will help you get enough of certain nutrients and limit others  The amount of servings you should eat depends on how many calories you need  Your dietitian can tell you how many calories you need  The number of servings listed next to the food groups below are for people who need about 2,000 calories each day    · Grains:  6 to 8 servings (3 of these servings should be whole-grain foods)    ¨ 1 slice of whole-grain bread     ¨ 1 ounce of dry cereal    ¨ ½ cup of cooked cereal, pasta, or brown rice    · Vegetables and fruits:  4 to 5 servings of fruits and 4 to 5 servings of vegetables    ¨ 1 medium fruit    ¨ ½ cup of frozen, canned (no added salt), or chopped fresh vegetables     ¨ ½ cup of fresh, frozen, dried, or canned fruit (canned in light syrup or fruit juice)    ¨ ½ cup of vegetable or fruit juice    · Dairy:  2 to 3 servings    ¨ 1 cup of nonfat (skim) or 1% milk    ¨ 1½ ounces of fat-free or low-fat cheese    ¨ 6 ounces of nonfat or low-fat yogurt    · Lean meat, poultry, and fish:  6 ounces or less    [employer] (chicken, turkey) with no skin    ¨ Fish (especially fatty fish, such as salmon, fresh tuna, or mackerel)    ¨ Lean beef and pork (loin, round, extra lean hamburger)    ¨ Egg whites and egg substitutes    · Nuts, seeds, and legumes:  4 to 5 servings each week    ¨ ½ cup of cooked beans and peas    ¨ 1½ ounces of unsalted nuts    ¨ 2 tablespoons of peanut butter or seeds    · Sweets and added sugars:  5 or less each week    ¨ 1 tablespoon of sugar, jelly, or jam    ¨ ½ cup of sorbet or gelatin    ¨ 1 cup of lemonade    · Fats:  2 to 3 servings each week    ¨ 1 teaspoon of soft margarine or vegetable oil    ¨ 1 tablespoon of mayonnaise    ¨ 2 tablespoons of salad dressing  Foods to avoid:   · Grains:      [employer], such as doughnuts, pastries, cookies, and biscuits (high in fat and sugar)    ¨ Mixes for cornbread and biscuits, packaged foods, such as bread stuffing, rice and pasta mixes, macaroni and cheese, and instant cereals (high in sodium)    · Fruits and vegetables:      ¨ Regular, canned vegetables (high in sodium)    ¨ Sauerkraut, pickled vegetables, and other foods prepared in brine (high in sodium)    ¨ Fried vegetables or vegetables in butter or high-fat sauces    ¨ Fruit in cream or butter sauce (high in fat)    · Dairy:      ¨ Whole milk, 2% milk, and cream (high in fat)    ¨ Regular cheese and processed cheese (high in fat and sodium)    · Meats and protein foods:      ¨ Smoked or cured meat, such as corned beef, aquino, ham, hot dogs, and sausage (high in fat and sodium)    ¨ Canned beans and canned meats or spreads, such as potted meats, sardines, anchovies, and imitation seafood (high in sodium)    ¨ Deli or lunch meats, such as bologna, ham, turkey, and roast beef (high in sodium)    ¨ High-fat meat (T-bone steak, regular hamburger, and ribs)    ¨ Whole eggs and egg yolks (high in fat)    · Other:      ¨ Seasonings made with salt, such as garlic salt, celery salt, onion salt, seasoned salt, meat tenderizers, and monosodium glutamate (MSG)    ¨ Miso soup and canned or dried soup mixes (high in sodium)    ¨ Regular soy sauce, barbecue sauce, teriyaki sauce, steak sauce, Worcestershire sauce, and most flavored vinegars (high in sodium)    ¨ Regular condiments, such as mustard, ketchup, and salad dressings (high in sodium)    ¨ Gravy and sauces, such as Jaden or cheese sauces (high in sodium and fat)    ¨ Drinks high in sugar, such as soda or fruit drinks    [employer] foods, such as salted chips, popcorn, pretzels, pork rinds, salted crackers, and salted nuts    ¨ Frozen foods, such as dinners, entrees, vegetables with sauces, and breaded meats (high in sodium)  Other guidelines to follow:   · Maintain a healthy weight  Your risk for heart disease is higher if you are overweight  Your healthcare provider may suggest that you lose weight if you are overweight  You can lose weight by eating fewer calories and foods that have added sugars and fat  The DASH meal plan can help you do this  Decrease calories by eating smaller portions at each meal and fewer snacks  Ask your healthcare provider for more information about how to lose weight  · Exercise regularly  Regular exercise can help you reach or maintain a healthy weight  Regular exercise can also help decrease your blood pressure and improve your cholesterol levels  Get 30 minutes or more of moderate exercise each day of the week  To lose weight, get at least 60 minutes of exercise   Talk to your healthcare provider about the best exercise program for you  · Limit alcohol  Women should limit alcohol to 1 drink a day  Men should limit alcohol to 2 drinks a day  A drink of alcohol is 12 ounces of beer, 5 ounces of wine, or 1½ ounces of liquor  Where can I find more information? · [facility] and [address]  [id number]  Samantha [place] [id number]  Phone: [phone]  Web Address: [facility] no  © 2017 [address] Information is for End User's use only and may not be sold, redistributed or otherwise used for commercial purposes  All illustrations and images included in CareNotes® are the copyrighted property of [employer] , [employer]  or Rasta Whitt  The above information is an  only  It is not intended as medical advice for individual conditions or treatments  Talk to your doctor, nurse or pharmacist before following any medical regimen to see if it is safe and effective for you  Calorie Counting Diet   WHAT YOU NEED TO KNOW:   What is a calorie counting diet? It is a meal plan based on counting calories each day to reach a healthy body weight  You will need to eat fewer calories if you are trying to lose weight  Weight loss may decrease your risk for certain health problems or improve your health if you have health problems  Some of these health problems include heart disease, high blood pressure, and diabetes  What foods should I avoid? Your dietitian will tell you if you need to avoid certain foods based on your body weight and health condition  You may need to avoid high-fat foods if you are at risk for or have heart disease  You may need to eat fewer foods from the breads and starches food group if you have diabetes  How many calories are in foods? The following is a list of foods and drinks with the approximate number of calories in each   Check the food label to find the exact number of calories  A dietitian can tell you how many calories you should have from each food group each day    · Carbohydrate:      ¨ ½ of a 3-inch bagel, 1 slice of bread, or ½ of a hamburger bun or hot dog bun (80)    ¨ 1 (8-inch) flour tortilla or ½ cup of cooked rice (100)    ¨ 1 (6-inch) corn tortilla (80)    ¨ 1 (6-inch) pancake or 1 cup of bran flakes cereal (110)    ¨ ½ cup of cooked cereal (80)    ¨ ½ cup of cooked pasta (85)    ¨ 1 ounce of pretzels (100)    ¨ 3 cups of air-popped popcorn without butter or oil (80)    · Dairy:      ¨ 1 cup of skim or 1% milk (90)    ¨ 1 cup of 2% milk (120)    ¨ 1 cup of whole milk (160)    ¨ 1 cup of 2% chocolate milk (220)    ¨ 1 ounce of low-fat cheese with 3 grams of fat per ounce (70)    ¨ 1 ounce of cheddar cheese (114)    ¨ ½ cup of 1% fat cottage cheese (80)    ¨ 1 cup of plain or sugar-free, fat-free yogurt (90)    · Protein foods:      ¨ 3 ounces of fish (not breaded or fried) (95)    ¨ 3 ounces of breaded, fried fish (195)    ¨ ¾ cup of tuna canned in water (105)    ¨ 3 ounces of chicken breast without skin (105)    ¨ 1 fried chicken breast with skin (350)    ¨ ¼ cup of fat free egg substitute (40)    ¨ 1 large egg (75)    ¨ 3 ounces of lean beef or pork (165)    ¨ 3 ounces of fried pork chop or ham (185)    ¨ ½ cup of cooked dried beans, such as kidney, clemons, lentils, or navy (115)    ¨ 3 ounces of bologna or lunch meat (225)    ¨ 2 links of breakfast sausage (140)    · Vegetables:      ¨ ½ cup of sliced mushrooms (10)    ¨ 1 cup of salad greens, such as lettuce, spinach, or ricardo (15)    ¨ ½ cup of steamed asparagus (20)    ¨ ½ cup of cooked summer squash, zucchini squash, or green or wax beans (25)    ¨ 1 cup of broccoli or cauliflower florets, or 1 medium tomato (25)    ¨ 1 large raw carrot or ½ cup of cooked carrots (40)    ¨ ? of a medium cucumber or 1 stalk of celery (5)    ¨ 1 small baked potato (160)    ¨ 1 cup of breaded, fried vegetables (230)    · Fruit:      ¨ 1 (6-inch) banana (55)     ¨ ½ of a 4-inch grapefruit (55)    ¨ 15 grapes (60)    ¨ 1 medium orange or apple (70)    ¨ 1 large peach (65)    ¨ 1 cup of fresh pineapple chunks (75)    ¨ 1 cup of melon cubes (50)    ¨ 1¼ cups of whole strawberries (45)    ¨ ½ cup of fruit canned in juice (55)    ¨ ½ cup of fruit canned in heavy syrup (110)    ¨ ?  cup of raisins (130)    ¨ ½ cup of unsweetened fruit juice (60)    ¨ ½ cup of grape, cranberry, or prune juice (90)    · Fat:      ¨ 10 peanuts or 2 teaspoons of peanut butter (55)    ¨ 2 tablespoons of avocado or 1 tablespoon of regular salad dressing (45)    ¨ 2 slices of aquino (90)    ¨ 1 teaspoon of oil, such as safflower, canola, corn, or olive oil (45)    ¨ 2 teaspoons of low-fat margarine, or 1 tablespoon of low-fat mayonnaise (50)    ¨ 1 teaspoon of regular margarine (40)    ¨ 1 tablespoon of regular mayonnaise (135)    ¨ 1 tablespoon of cream cheese or 2 tablespoons of low-fat cream cheese (45)    ¨ 2 tablespoons of vegetable shortening (215)    · Dessert and sweets:      ¨ 8 animal crackers or 5 vanilla wafers (80)    ¨ 1 frozen fruit juice bar (80)    ¨ ½ cup of ice milk or low-fat frozen yogurt (90)    ¨ ½ cup of sherbet or sorbet (125)    ¨ ½ cup of sugar-free pudding or custard (60)    ¨ ½ cup of ice cream (140)    ¨ ½ cup of pudding or custard (175)    ¨ 1 (2-inch) square chocolate brownie (185)    · Combination foods:      ¨ Bean burrito made with an 8-inch tortilla, without cheese (275)    ¨ Chicken breast sandwich with lettuce and tomato (325)    ¨ 1 cup of chicken noodle soup (60)    ¨ 1 beef taco (175)    ¨ Regular hamburger with lettuce and tomato (310)    ¨ Regular cheeseburger with lettuce and tomato (410)     ¨ ¼ of a 12-inch cheese pizza (280)    ¨ Fried fish sandwich with lettuce and tomato (425)    ¨ Hot dog and bun (275)    ¨ 1½ cups of macaroni and cheese (310)    ¨ Taco salad with a fried tortilla shell (870)    · Low-calorie foods:      ¨ 1 tablespoon of ketchup or 1 tablespoon of fat free sour cream (15)    ¨ 1 teaspoon of mustard (5)    ¨ ¼ cup of salsa (20)    ¨ 1 large dill pickle (15)    ¨ 1 tablespoon of fat free salad dressing (10)    ¨ 2 teaspoons of low-sugar, light jam or jelly, or 1 tablespoon of sugar-free syrup (15)    ¨ 1 sugar-free popsicle (15)    ¨ 1 cup of club soda, seltzer water, or diet soda (0)  CARE AGREEMENT:   You have the right to help plan your care  Discuss treatment options with your caregivers to decide what care you want to receive  You always have the right to refuse treatment  The above information is an  only  It is not intended as medical advice for individual conditions or treatments  Talk to your doctor, nurse or pharmacist before following any medical regimen to see if it is safe and effective for you  © 2017 [address] Information is for End User's use only and may not be sold, redistributed or otherwise used for commercial purposes  All illustrations and images included in CareNotes® are the copyrighted property of [employer]  or Rasta Whitt

## 2018-04-26 NOTE — PLAN OF CARE
Problem: CARDIOVASCULAR - ADULT  Goal: Maintains optimal cardiac output and hemodynamic stability  INTERVENTIONS:  - Monitor I/O, vital signs and rhythm  - Monitor for S/S and trends of decreased cardiac output i e  bleeding, hypotension  - Administer and titrate ordered vasoactive medications to optimize hemodynamic stability  - Assess quality of pulses, skin color and temperature  - Assess for signs of decreased coronary artery perfusion - ex   Angina  - Instruct patient to report change in severity of symptoms   Outcome: Progressing    Goal: Absence of cardiac dysrhythmias or at baseline rhythm  INTERVENTIONS:  - Continuous cardiac monitoring, monitor vital signs, obtain 12 lead EKG if indicated  - Administer antiarrhythmic and heart rate control medications as ordered  - Monitor electrolytes and administer replacement therapy as ordered   Outcome: Progressing      Problem: PAIN - ADULT  Goal: Verbalizes/displays adequate comfort level or baseline comfort level  Interventions:  - Encourage patient to monitor pain and request assistance  - Assess pain using appropriate pain scale  - Administer analgesics based on type and severity of pain and evaluate response  - Implement non-pharmacological measures as appropriate and evaluate response  - Consider cultural and social influences on pain and pain management  - Notify physician/advanced practitioner if interventions unsuccessful or patient reports new pain  Outcome: Progressing      Problem: INFECTION - ADULT  Goal: Absence or prevention of progression during hospitalization  INTERVENTIONS:  - Assess and monitor for signs and symptoms of infection  - Monitor lab/diagnostic results  - Monitor all insertion sites, i e  indwelling lines, tubes, and drains  - Monitor endotracheal (as able) and nasal secretions for changes in amount and color  - [place] appropriate cooling/warming therapies per order  - Administer medications as ordered  - Instruct and encourage patient and family to use good hand hygiene technique  - Identify and instruct in appropriate isolation precautions for identified infection/condition  Outcome: Progressing    Goal: Absence of fever/infection during neutropenic period  INTERVENTIONS:  - Monitor WBC  - Implement neutropenic guidelines  Outcome: Progressing      Problem: SAFETY ADULT  Goal: Patient will remain free of falls  INTERVENTIONS:  - Assess patient frequently for physical needs  -  Identify cognitive and physical deficits and behaviors that affect risk of falls    -  [place] fall precautions as indicated by assessment   - Educate patient/family on patient safety including physical limitations  - Instruct patient to call for assistance with activity based on assessment  - Modify environment to reduce risk of injury  - Consider OT/PT consult to assist with strengthening/mobility  Outcome: Progressing    Goal: Maintain or return to baseline ADL function  INTERVENTIONS:  -  Assess patient's ability to carry out ADLs; assess patient's baseline for ADL function and identify physical deficits which impact ability to perform ADLs (bathing, care of mouth/teeth, toileting, grooming, dressing, etc )  - Assess/evaluate cause of self-care deficits   - Assess range of motion  - Assess patient's mobility; develop plan if impaired  - Assess patient's need for assistive devices and provide as appropriate  - Encourage maximum independence but intervene and supervise when necessary  ¯ Involve family in performance of ADLs  ¯ Assess for home care needs following discharge   ¯ Request OT consult to assist with ADL evaluation and planning for discharge  ¯ Provide patient education as appropriate  Outcome: Progressing    Goal: Maintain or return mobility status to optimal level  INTERVENTIONS:  - Assess patient's baseline mobility status (ambulation, transfers, stairs, etc )    - Identify cognitive and physical deficits and behaviors that affect mobility  - Identify mobility aids required to assist with transfers and/or ambulation (gait belt, sit-to-stand, lift, walker, cane, etc )  - [place] fall precautions as indicated by assessment  - Record patient progress and toleration of activity level on Mobility SBAR; progress patient to next Phase/Stage  - Instruct patient to call for assistance with activity based on assessment  - Request Rehabilitation consult to assist with strengthening/weightbearing, etc   Outcome: Progressing      Problem: DISCHARGE PLANNING  Goal: Discharge to home or other facility with appropriate resources  INTERVENTIONS:  - Identify barriers to discharge w/patient and caregiver  - Arrange for needed discharge resources and transportation as appropriate  - Identify discharge learning needs (meds, wound care, etc )  - Arrange for interpretive services to assist at discharge as needed  - Refer to Case Management Department for coordinating discharge planning if the patient needs post-hospital services based on physician/advanced practitioner order or complex needs related to functional status, cognitive ability, or social support system  Outcome: Progressing      Problem: Knowledge Deficit  Goal: Patient/family/caregiver demonstrates understanding of disease process, treatment plan, medications, and discharge instructions  Complete learning assessment and assess knowledge base  Interventions:  - Provide teaching at level of understanding  - Provide teaching via preferred learning methods  Outcome: Progressing      Problem: NEUROSENSORY - ADULT  Goal: Achieves stable or improved neurological status  INTERVENTIONS  - Monitor and report changes in neurological status  - Initiate measures to prevent increased intracranial pressure  - Maintain blood pressure and fluid volume within ordered parameters to optimize cerebral perfusion  - Monitor temperature, glucose, and sodium or any other associated labs   Initiate appropriate interventions as ordered  - Monitor for seizure activity   - Administer anti-seizure medications as ordered  Outcome: Progressing    Goal: Absence of seizures  INTERVENTIONS  - Monitor for seizure activity  - Administer anti-seizure medications as ordered  - Monitor neurological status  Outcome: Progressing    Goal: Remains free of injury related to seizures activity  INTERVENTIONS  - Maintain airway, patient safety  and administer oxygen as ordered  - Monitor patient for seizure activity, document and report duration and description of seizure to physician/advanced practitioner  - If seizure occurs,  ensure patient safety during seizure  - Reorient patient post seizure  - Seizure pads on all 4 side rails  - Instruct patient/family to notify RN of any seizure activity including if an aura is experienced  - Instruct patient/family to call for assistance with activity based on nursing assessment  - Administer anti-seizure medications as ordered  - Monitor fetal well being  Outcome: Progressing    Goal: Achieves maximal functionality and self care  INTERVENTIONS  - Monitor swallowing and airway patency with patient fatigue and changes in neurological status  - Encourage and assist patient to increase activity and self care with guidance from rehab services  - Encourage visually impaired, hearing impaired and aphasic patients to use assistive/communication devices  Outcome: Progressing      Problem: GENITOURINARY - ADULT  Goal: Maintains or returns to baseline urinary function  INTERVENTIONS:  - Assess urinary function  - Encourage oral fluids to ensure adequate hydration  - Administer IV fluids as ordered to ensure adequate hydration  - Administer ordered medications as needed  - Offer frequent toileting  - Follow urinary retention protocol if ordered  Outcome: Progressing    Goal: Absence of urinary retention  INTERVENTIONS:  - Assess patients ability to void and empty bladder  - Monitor I/O  - Bladder scan as needed  - Discuss with physician/AP medications to alleviate retention as needed  - Discuss catheterization for long term situations as appropriate  Outcome: Progressing    Goal: Urinary catheter remains patent  INTERVENTIONS:  - Assess patency of urinary catheter  - If patient has a chronic aquino, consider changing catheter if non-functioning  - Follow guidelines for intermittent irrigation of non-functioning urinary catheter  Not applicable

## 2018-04-26 NOTE — CASE MANAGEMENT
Initial Clinical Review    Admission: Date/Time/Statement:  OBS 4/25 @ 1956    Orders Placed This Encounter   Procedures    Place in Observation (expected length of stay for this patient is less than two midnights)     Standing Status:   Standing     Number of Occurrences:   1     Order Specific Question:   Admitting Physician     Answer:   Joyce Eid [[id number]]     Order Specific Question:   Level of Care     Answer:   Med Surg [[id number]]       ED: Date/Time/Mode of Arrival:   ED Arrival Information     Expected Arrival Acuity Means of Arrival Escorted By Service Admission Type    - 4/25/2018 17:08 Urgent Walk-In Spouse General Medicine Urgent    Arrival Complaint    HYPERTENSION        Chief Complaint:   Chief Complaint   Patient presents with    High Blood Pressure     pt reports he was at PCP for another reason, had high blood pressure at the office was instructed to take additional dose of Losartan-reports hypertension continues  denies chest pain       History of Illness: 69 YO male presents with high blood pressure today  Pt states he was seeing his PCP for a rash, notes to have an elevated blood pressure in the office  Pt take Valsartan for his pressure, was told to double his dose  Pt went home and did take an extra Valsartan but states his pressure continued to elevated throughout the evening  Pt denies associated lightheadedness, headaches, chest pain, shortness of breath  States he has felt fatigue for ~2 weeks  Pt denies similar issues with his pressure before  Pt denies CP/SOB/F/C/N/V/D/C, no dysuria, burning on urination or blood in urine      ED Vital Signs:   ED Triage Vitals [04/25/18 1715]   Temperature Pulse Respirations Blood Pressure SpO2   97 5 °F (36 4 °C) 91 18 (!) 199/103 96 %      Temp Source Heart Rate Source Patient Position - Orthostatic VS BP Location FiO2 (%)   Temporal Monitor Sitting Right arm --      Pain Score       No Pain        Wt Readings from Last 1 Encounters:   04/25/18 116 kg (256 lb)       Vital Signs (abnormal):   04/25/18 2000 -- 84 18  183/102 98 % -- AJIT   04/25/18 1945 -- 86 18  186/99 97 % -- AJIT   04/25/18 1930 -- 82 14  189/99 97 % -- AJIT   04/25/18 1915 -- 82 18  195/99 97 % -- AJIT   04/25/18 1912 -- 83 16  180/103 97 % -- AB   04/25/18 1845 -- 84 15  177/94 95 % -- AJIT   04/25/18 1833 -- 84 16  184/98 95 % -- JRK   04/25/18 1825 -- 82 18  204/110 94 % -- JTP   04/25/18 1751 -- 90 18  191/107 92 %         Abnormal Labs/Diagnostic Test Results: Alk phos 120,   TSH 5 524,   Wbc's 10 44  Urine: trace leukocytes,   Trace blood,   occ bacteria  CXR: No acute cardiopulmonary disease  EKG: Normal sinus rhythm    ED Treatment:   Medication Administration from 04/25/2018 1707 to 04/25/2018 2027       Date/Time Order Dose Route Action Action by Comments     04/25/2018 1822 sodium chloride 0 9 % bolus 1,000 mL 1,000 mL Intravenous [address] Suzy Pickard RN      04/25/2018 1827 hydrALAZINE (APRESOLINE) injection 10 mg 10 mg Intravenous Given Suzy Pickard RN      04/25/2018 1924 labetalol (NORMODYNE) injection 10 mg 10 mg Intravenous Given Juan Rincon RN           Past Medical/Surgical History: Active Ambulatory Problems     Diagnosis Date Noted    Back pain 01/13/2016    Pneumonia 01/13/2016    Dyspnea 01/13/2016    DVT (deep venous thrombosis) (HCC) 01/13/2016    GERD (gastroesophageal reflux disease) 01/13/2016    Uncontrolled hypertension 01/13/2016    PE (pulmonary embolism) 12/26/1999    Prostate hypertrophy     Osteoarthritis     Obesity (BMI 30-39  9)      Resolved Ambulatory Problems     Diagnosis Date Noted    Left rib fracture 01/13/2016     Past Medical History:   Diagnosis Date    Allergic rhinitis     Arthritis     COPD, mild (HCC)     GERD (gastroesophageal reflux disease)     History of DVT (deep vein thrombosis)     Hypertension     Kidney stones     Nephrolithiasis     Obesity (BMI 30-39  9)     Osteoarthritis     PE (pulmonary embolism) 12/26/1999    Pneumonia 1/13/2016    Prostate hypertrophy     Rib pain     Wears glasses        Admitting Diagnosis: High blood pressure [I10]  Uncontrolled hypertension [I10]    Age/Sex: 70 y o  male    Assessment/Plan:   ASSESSMENT AND PLAN  · Accelerated uncontrolled hypertension  · Hypertensive emergency  · Occipital headache  · Factor 5 Leidin mutation  · Hypercoagulable disorder with chronic long-term use of Factor Xa inhibitors  · Morbid obesity  · Large left-sided upper abdominal wall hernia  · Old left-sided lower rib fracture  · Benign prostatic hypertrophy        I had a long discussion with the patient and his wife  Also spoke with the ER physician multiple times  Noted that patient was given IV hydralazine and labetalol in spite of that patient's blood pressure has been consistently elevated  Patient will be admitted at least for overnight and his blood pressure will be checked more frequently, reviewed with the nursing staff   P r n  IV hydralazine can be used and if his pressure does not improve with p r n  dosing then he may need continuous IV infusion of labetalol  Might target for his blood pressure would be keeping it around 170 tonight and then gradually bring it down to 160 by tomorrow  I am also little concerned with his occipital headache and would recommend getting a CT scan focusing on the posterior cranial fossa  Fortunately he does not have any neurological deficits my pre test probability for any bleeding or any other concerns issues is low  Patient is already anticoagulated with Factor Xa inhibitors for his underlying hypercoagulable disorder so we do not need to give him any additional anticoagulants  There is some concerns about patient having urinary difficulty, recommend getting a UA sample, continue his Flomax and follow bladder protocol      Will obtain abdominal ultrasound for patient's lateral wall large mass probably related to his hernia    Unless there is any new finding then we will consider CT scan  P r n  Tylenol can be used if he would have any headaches  Discussed in detail with patient and his wife regarding possible complications in this situation      Admission Orders:  OBS  TELE  Neuro checks q4h  SCD's  EKG  CT Head  US ABD  Urine Cx    Scheduled Meds:   Current Facility-Administered Medications:  apixaban 5 mg Oral BID Miguel Foster MD   chlorthalidone 25 mg Oral Daily Miguel Foster MD   finasteride 5 mg Oral Daily Miguel Foster MD   fluticasone furoate-vilanterol 1 puff Inhalation Daily Miguel Foster MD   hydrALAZINE 5 mg Intravenous Q4H PRN Miguel Foster MD   LORazepam 0 5 mg Oral Q8H PRN Miguel Foster MD   losartan 100 mg Oral Daily Miguel Foster MD   tamsulosin 0 4 mg Oral Daily Miguel Foster MD   valACYclovir 1,000 mg Oral TID Miguel Foster MD     Continuous Infusions:    PRN Meds: hydrALAZINE    LORazepam po x 1  4/26:  98 - 85 - 20   161/100   RA 95%

## 2018-04-26 NOTE — DISCHARGE SUMMARY
Discharge Summary - [facility] Internal Medicine   An Gray 70 y o  male MRN: [id number]    Formerly [facility] [address] SCAN Room / Bed: [address] 457/E4 -* PMTUGEQLX: [id number]      Admitting Provider: Araceli Arrington MD  Discharge Provider: Araceli Arrington MD  Admission Date: 4/25/2018       Discharge Date: 04/26/18  Primary Care Physician at Discharge: Araceli Arrington [phone]    Primary Discharge Diagnosis    · Accelerated uncontrolled hypertension  · Hypertensive emergency  · Occipital headache  · Factor V Leidin mutation  · Hypercoagulable disorder with chronic long-term use of Factor Xa inhibitors  · Morbid obesity  · Large left-sided upper abdominal wall hernia  · Old left-sided lower rib fracture  · Benign prostatic hypertrophy    Other Problems Addressed  Patient Active Problem List    Diagnosis Date Noted    Factor V Leiden mutation ([facility] ) 04/25/2018    Current use of long term anticoagulation 04/25/2018    Abdominal hernia 04/25/2018    Back pain 01/13/2016    Pneumonia 01/13/2016    Dyspnea 01/13/2016    DVT (deep venous thrombosis) ([facility] ) 01/13/2016    GERD (gastroesophageal reflux disease) 01/13/2016    Uncontrolled hypertension 01/13/2016    Prostate hypertrophy     Osteoarthritis     Obesity (BMI 30-39  9)     PE (pulmonary embolism) 12/26/1999         Diagnostic Procedures Performed  Labs and CT scan Head    Treatments   IV hydralazine and labetalol    Procedures   None    Other Pertinent Test Results    Results from last 7 days  Lab Units 04/25/18  1823   WBC Thousand/uL 10 44*   HEMOGLOBIN g/dL 16 4   HEMATOCRIT % 49 4*   PLATELETS Thousands/uL 265       Results from last 7 days  Lab Units 04/25/18  1823   SODIUM mmol/L 140   POTASSIUM mmol/L 4 3   CHLORIDE mmol/L 104   CO2 mmol/L 29   BUN mg/dL 18   CREATININE mg/dL 0 97   CALCIUM mg/dL 9 8   TOTAL PROTEIN g/dL 7 7   BILIRUBIN TOTAL mg/dL 0 47   ALK PHOS U/L 120*   ALT U/L 36   AST U/L 23   GLUCOSE RANDOM mg/dL 93       Results from last 7 days  Lab Units 04/25/18  1823   TROPONIN I ng/mL <0 02       Presenting Problem/History of Present Illness  Uncontrolled hypertension    HOSPITAL COURSE:    Bar Herrera, 70 y o  male who presents to ER yesterday for his consistently increased blood pressure  Patient required IV hydralazine with subsequent dosing with IV labetalol  Subsequent to that patient blood pressure came down in 160s range  As he was complaining of mild occipital headache a CT scan of his brain was done which did not reveal any bleeding  Today patient's blood pressure is running around 150s to 160s range and he has no symptoms  He was waiting to get his ultrasound of his abdomen for his enlarged abdomen, related to his large hernia  Patient prefers to go home and return back at a later time for additional testing  PHYSICAL EXAM:  Vitals:   Temp:  [97 3 °F (36 3 °C)-98 °F (36 7 °C)] 98 °F (36 7 °C)  HR:  [77-91] 84  Resp:  [14-20] 18  BP: (156-204)/() 156/96    General Appearance:    Alert, cooperative, no distress   Head:    Normocephalic without obvious abnormality   Eyes:    PERRL, conjunctiva/corneas clear, EOM's intact        Neck:   Supple, no adenopathy, no JVD   Back:     Symmetric, no spinal or CVA tenderness   Lungs:     Clear to auscultation bilaterally, no wheezing or rhonchi   Heart:    Regular rate and rhythm, S1 and S2 normal, no murmur   Abdomen:     Soft, protuberant secondary to central obesity, non-tender, bowel sounds active , large protuberance on the lateral aspect of the left side  Extremities:   Extremities normal  No clubbing, cyanosis or edema   Psych:   Normal Affect   Neurologic:   CNII-XII intact  Strength symmetric, speech intact       Discharge Disposition: Home/Self Care     Discharge Medications:  See after visit summary for reconciled discharge medications provided to patient and family        Patient has been advised to start taking chlorthalidone 25 mg p o  once daily and his Cozaar has been increased to 100 mg p o  daily  Patient has 50 mg tablets at home and he will take 50 mg b i d  Daily till he runs out        Allergies  No Known Allergies    Diet restrictions: low na  Activity restrictions: as tolerated  Discharge Condition: good    Outpatient Follow-Up  In 1 week with Dr Blandon Patch    Code Status: Prior  Advance Directive and Living Will: Received

## 2018-04-26 NOTE — H&P
History and Physical -  Internal Medicine     Rossi Ear 70 y o  male MRN: [id number]  Unit/Bed#: E4 -01 Encounter: [id number]      ASSESSMENT AND PLAN  · Accelerated uncontrolled hypertension  · Hypertensive emergency  · Occipital headache  · Factor 5 Leidin mutation  · Hypercoagulable disorder with chronic long-term use of Factor Xa inhibitors  · Morbid obesity  · Large left-sided upper abdominal wall hernia  · Old left-sided lower rib fracture  · Benign prostatic hypertrophy      I had a long discussion with the patient and his wife  Also spoke with the ER physician multiple times  Noted that patient was given IV hydralazine and labetalol in spite of that patient's blood pressure has been consistently elevated  Patient will be admitted at least for overnight and his blood pressure will be checked more frequently, reviewed with the nursing staff   P r n  IV hydralazine can be used and if his pressure does not improve with p r n  dosing then he may need continuous IV infusion of labetalol  Might target for his blood pressure would be keeping it around 170 tonight and then gradually bring it down to 160 by tomorrow  I am also little concerned with his occipital headache and would recommend getting a CT scan focusing on the posterior cranial fossa  Fortunately he does not have any neurological deficits my pre test probability for any bleeding or any other concerns issues is low  Patient is already anticoagulated with Factor Xa inhibitors for his underlying hypercoagulable disorder so we do not need to give him any additional anticoagulants  There is some concerns about patient having urinary difficulty, recommend getting a UA sample, continue his Flomax and follow bladder protocol  Will obtain abdominal ultrasound for patient's lateral wall large mass probably related to his hernia  Unless there is any new finding then we will consider CT scan  P r n   Tylenol can be used if he would have any headaches  Discussed in detail with patient and his wife regarding possible complications in this situation  _____________________________________________________________________________    PRIMARY CARE PHYSICIAN: Paulino Calderón [phone]    CC:   Blood pressure not coming down    HPI: Han See is a 70 y o  male who presents to [facility] for his consistently increased blood pressure  He was seen earlier in the office when his blood pressure was noted to be around 180/100  Patient wanted to go back home and agreed to take his blood pressure medicine  He normally takes 50 mg of Cozaar in the morning which she took and then after he left the office even began to can on the 50 mg  His wife checked his blood pressure multiple times and was noted to be running more than 312 systolic and at x1 90 systolic  Patient was worried and was also started to experience mild occipital headache  He has not had any focal weakness or paresthesias  Denies any visual impairment  Denies any slurred speech  Denies any chest pain or palpitation  He has increased weight since he returned back from [place]  Patient has difficulty with urination at times and he takes Flomax  He feels that he has an urge to go but his urinary stream is weak  A bladder scan has been ordered and is awaited  He denies any fever chills or rigors  Patient has underlying history of hypercoagulable disorder with multiple DVTs and pulmonary embolisms and is on long-term anticoagulation  He is maintained on Eliquis which he takes twice a day  Patient denies any bleeding        ALLERGIES  No Known Allergies  HOME MEDICATIONS  Prescriptions Prior to Admission   Medication    apixaban (ELIQUIS) 5 mg    finasteride (PROSCAR) 5 mg tablet    fluticasone furoate-vilanterol (BREO ELLIPTA)    LORazepam (ATIVAN) 0 5 mg tablet    losartan (COZAAR) 50 mg tablet    Naproxen Sodium (ALEVE PO)    tamsulosin (FLOMAX) 0 4 mg    valACYclovir (VALTREX) 1,000 mg tablet     PAST HISTORY  Past Medical History:   Diagnosis Date    Allergic rhinitis     Arthritis     COPD, mild (HCC)     GERD (gastroesophageal reflux disease)     History of DVT (deep vein thrombosis)     Hypertension     Kidney stones     Nephrolithiasis     Obesity (BMI 30-39  9)     Osteoarthritis     PE (pulmonary embolism) 12/26/1999    Pneumonia 1/13/2016    Prostate hypertrophy     Rib pain     7,8,9 ribs on Left chest wall with Lsided hernia    Wears glasses     reading     Past Surgical History:   Procedure Laterality Date    HERNIA REPAIR      b/l inguinal hernia with mesh    KIDNEY STONE SURGERY      KNEE ARTHROPLASTY Left     TKR    TX KNEE SCOPE,SHAVE ARTICULAR CART Right 11/8/2016    Procedure: ARTHROSCOPY KNEE WITH PARTIAL MEDIAL MENISECTOMY;  Surgeon: Elda Ferrer MD;  Location: [facility];  Service: Orthopedics     SOCIAL HISTORY  Social History     Social History    Marital status: /Civil Union     Spouse name: N/A    Number of children: N/A    Years of education: N/A     Occupational History    Not on file  Social History Main Topics    Smoking status: Former Smoker     Packs/day: 1 00     Years: 20 00     Types: Cigarettes     Quit date: 1995    Smokeless tobacco: Never Used    Alcohol use Yes      Comment: 1 daily    Drug use: No    Sexual activity: Not on file     Other Topics Concern    Not on file     Social History Narrative    No narrative on file     FAMILY HISTORY  Family History   Problem Relation Age of Onset    Heart disease Mother     Cancer Mother     Cancer Father        REVIEW OF SYSTEMS    General:   No Fever or chills; No significant weight loss or gain  EENT:   No ear pain, facial swelling; No sneezing, sore throat  Skin:   No rashes, color changes  Respiratory:     No shortness of breath, cough, wheezing, stridor  Cardiovascular:     No chest pain, palpitations     Gastrointestinal:    No nausea, vomiting, diarrhea; No abdominal pain  Musculoskeletal:     No arthralgias, myalgias, swelling  Neurologic:   No dizziness, numbness, weakness  No speech difficulties  Mild occipital headache, dull, severity 1 on 10 on numeric pain scale  No radiation  Not associated with any dizziness lightheadedness  Psych:   No agitation, suicidal ideations    Otherwise, All other twelve-point review of systems normal      OBJECTIVE    Temp:  [97 3 °F (36 3 °C)-97 5 °F (36 4 °C)] 97 3 °F (36 3 °C)  HR:  [77-91] 77  Resp:  [14-18] 18  BP: (172-204)/() 172/100    No intake or output data in the 24 hours ending 04/25/18 2140    Invasive Devices     Peripheral Intravenous Line            Peripheral IV 04/25/18 Left Antecubital less than 1 day                PHYSICAL EXAM  General Appearance:    Alert, cooperative, no distress, lying in bed wearing hospital attire, wife is at bedside  Head:    Normocephalic without obvious abnormality   Eyes:    PERRL, conjunctiva/corneas clear, EOM's intact        Neck:   Supple, no adenopathy, no JVD   Back:     Symmetric, no spinal or CVA tenderness   Lungs:     Clear to auscultation bilaterally, no wheezing or rhonchi   Heart:    Regular rate and rhythm, S1 and S2 normal, no murmur   Abdomen:     Soft, protuberant from large central obesity, asymmetrical bulge, noted on upper left lateral aspect of abdomen, this has been a chronic issue and he has had extensive workup done with cardiothoracic and General surgery Service  He has been given option for surgery but due to involved risk he has been not very keen on pursuing that, abdomen otherwise is  non-tender, bowel sounds active    Extremities:   Extremities normal  No clubbing, cyanosis or edema   Psych:   Normal Affect   Neurologic:   CNII-XII intact   Strength symmetric, speech intact     Skin:  Right lateral aspect of his leg slight maculopapular lesions which he states are related to his recent shingles and he has been taking Valtrex and since then this has been resolving  Patient has inguinal candidiasis more on the left side  LABS    Results from last 7 days  Lab Units 04/25/18  1823   WBC Thousand/uL 10 44*   HEMOGLOBIN g/dL 16 4   HEMATOCRIT % 49 4*   PLATELETS Thousands/uL 265       Results from last 7 days  Lab Units 04/25/18  1823   SODIUM mmol/L 140   POTASSIUM mmol/L 4 3   CHLORIDE mmol/L 104   CO2 mmol/L 29   BUN mg/dL 18   CREATININE mg/dL 0 97   CALCIUM mg/dL 9 8   TOTAL PROTEIN g/dL 7 7   BILIRUBIN TOTAL mg/dL 0 47   ALK PHOS U/L 120*   ALT U/L 36   AST U/L 23   GLUCOSE RANDOM mg/dL 93     Lab Results   Component Value Date    SPUTUMCULTUR 3+ Growth of Mixed Respiratory Miriam 01/13/2016       IMAGING Xr Chest 2 Views    Result Date: 4/25/2018  Narrative: CHEST INDICATION:   high bp  COMPARISON:  4/19/2017 EXAM PERFORMED/VIEWS:  XR CHEST PA & LATERAL Images: 3 FINDINGS: Cardiomediastinal silhouette appears unremarkable  The lungs are clear  No pneumothorax or pleural effusion  Osseous structures appear within normal limits for patient age  Impression: No acute cardiopulmonary disease  Workstation performed: [id number]       EKG, Pathology, and Other Studies:    CODE STATUS: Prior    COUNSELING / [address] [place] [address]  Total floor / unit time spent today 38 minutes  Greater than 50% of total time was spent with the patient and / or family counseling and / or coordination of care

## 2018-04-27 NOTE — ED PROVIDER NOTES
History  Chief Complaint   Patient presents with    Hypertension     Pt states "I was here last night for same and admitted and discharged today after they got it down to the 150's  My wife too it at home and it was very high again" Pt denies cp/sob     Pt  Is taking his bp at home and yest  It was around 204/100's, he had a neg  CT head yest  And was admitted overnight for HTN  He was discharged around 2pm and sbp was 150's  Pt  Returns because bp was still elevated  Pt  Is asymptomatic, no headaches, no chest pain  Pt  Is on losartan 50mg once a day - after the admission they told him to increased it to 50mg bid  Pt  Is on eliquis for factor 5 Leiden / PE in 2000    Pt  Had po meds this am in the hospital   Losartan 100mg and chlorthalidone 25 mg tablet             Prior to Admission Medications   Prescriptions Last Dose Informant Patient Reported? Taking? LORazepam (ATIVAN) 0 5 mg tablet   Yes No   Sig: Take 0 5 mg by mouth 2 (two) times a day as needed     LORazepam (ATIVAN) 0 5 mg tablet   No No   Sig: Take 1 tablet (0 5 mg total) by mouth daily at bedtime for 10 days   apixaban (ELIQUIS) 5 mg   No No   Sig: 10 mg orally twice daily for 7 days and then 5 mg orally twice daily  Patient taking differently: 5 mg 2 (two) times a day     chlorthalidone 25 mg tablet   No No   Sig: Take 1 tablet (25 mg total) by mouth daily   finasteride (PROSCAR) 5 mg tablet   Yes No   Sig: Take 5 mg by mouth daily  fluticasone furoate-vilanterol (BREO ELLIPTA)   No No   Sig: Inhale 1 puff daily   losartan (COZAAR) 100 MG tablet   No No   Sig: Take 1 tablet (100 mg total) by mouth daily   losartan (COZAAR) 50 mg tablet   Yes No   Sig: Take 50 mg by mouth daily  tamsulosin (FLOMAX) 0 4 mg   Yes No   Sig: Take 0 4 mg by mouth daily     valACYclovir (VALTREX) 1,000 mg tablet   Yes No   Sig: Take 1,000 mg by mouth 3 (three) times a day      Facility-Administered Medications: None       Past Medical History:   Diagnosis Date    Allergic rhinitis     Arthritis     COPD, mild (HCC)     GERD (gastroesophageal reflux disease)     History of DVT (deep vein thrombosis)     Hypertension     Kidney stones     Nephrolithiasis     Obesity (BMI 30-39  9)     Osteoarthritis     PE (pulmonary embolism) 12/26/1999    Pneumonia 1/13/2016    Prostate hypertrophy     Rib pain     7,8,9 ribs on Left chest wall with Lsided hernia    Wears glasses     reading       Past Surgical History:   Procedure Laterality Date    HERNIA REPAIR      b/l inguinal hernia with mesh    KIDNEY STONE SURGERY      KNEE ARTHROPLASTY Left     TKR    [place] KNEE SCOPE,SHAVE ARTICULAR CART Right 11/8/2016    Procedure: ARTHROSCOPY KNEE WITH PARTIAL MEDIAL MENISECTOMY;  Surgeon: Kayley Coleman MD;  Location: [facility] OR;  Service: Orthopedics       Family History   Problem Relation Age of Onset    Heart disease Mother     Cancer Mother     Cancer Father      I have reviewed and agree with the history as documented  Social History   Substance Use Topics    Smoking status: Former Smoker     Packs/day: 1 00     Years: 20 00     Types: Cigarettes     Quit date: 1995    Smokeless tobacco: Never Used    Alcohol use Yes      Comment: 1 daily        Review of Systems   Constitutional: Negative for appetite change, fatigue and fever  HENT: Negative for sore throat  Respiratory: Negative for cough, shortness of breath and wheezing  Cardiovascular: Negative for chest pain and leg swelling  Gastrointestinal: Negative for abdominal pain, diarrhea and vomiting  Genitourinary: Negative for dysuria and flank pain  Musculoskeletal: Negative for back pain and neck pain  Skin: Negative for rash  Neurological: Negative for syncope and headaches     Psychiatric/Behavioral:        Mood normal       Physical Exam  ED Triage Vitals   Temperature Pulse Respirations Blood Pressure SpO2   04/26/18 2110 04/26/18 2110 04/26/18 2110 04/26/18 2110 04/26/18 2110   98 4 °F (36 9 °C) 104 18 (!) 179/114 96 %      Temp src Heart Rate Source Patient Position - Orthostatic VS BP Location FiO2 (%)   -- 04/26/18 2215 -- 04/26/18 2215 --    Monitor  Right arm       Pain Score       04/26/18 2110       No Pain           Orthostatic Vital Signs  Vitals:    04/26/18 2110 04/26/18 2115 04/26/18 2215 04/26/18 2255   BP: (!) 179/114 (!) 161/108 (!) 177/100 (!) 172/88   Pulse: 104  100        Physical Exam   Constitutional: He is oriented to person, place, and time  He appears well-developed and well-nourished  HENT:   Head: Normocephalic and atraumatic  Neck: Normal range of motion  Neck supple  Cardiovascular: Normal rate and regular rhythm  Pulmonary/Chest: Effort normal and breath sounds normal    Abdominal: Soft  There is no tenderness  Musculoskeletal: Normal range of motion  Neurological: He is alert and oriented to person, place, and time  Skin: Skin is warm and dry  Nursing note and vitals reviewed  ED Medications  Medications   cloNIDine (CATAPRES) tablet 0 1 mg (0 1 mg Oral Given 4/26/18 2220)       Diagnostic Studies  Results Reviewed     None                 No orders to display              Procedures  ECG 12 Lead Documentation  Date/Time: 4/26/2018 9:50 PM  Performed by: KAYA Dewitt  Authorized by: KAYA Dewitt     Rate:     ECG rate:  106    ECG rate assessment: tachycardic    Rhythm:     Rhythm: sinus tachycardia    ST segments:     ST segments:  Non-specific           Phone Contacts  ED Phone Contact    ED Course                               MDM  Number of Diagnoses or Management Options  Hypertension:      Amount and/or Complexity of Data Reviewed  Clinical lab tests: ordered and reviewed  Tests in the radiology section of CPT®: ordered and reviewed    Risk of Complications, Morbidity, and/or Mortality  Presenting problems: moderate  General comments: bp lowered   Pt   Continues to be asymptomatic - will follow up with his dr  In the morning      CritCare Time    Disposition  Final diagnoses:   Hypertension     Time reflects when diagnosis was documented in both MDM as applicable and the Disposition within this note     Time User Action Codes Description Comment    4/26/2018 10:12 PM Osmani, [address] Sherice Titi Pedraza Hypertension       ED Disposition     ED Disposition Condition Comment    Discharge  [facility] discharge to home/self care  Condition at discharge: Stable        Follow-up Information     Follow up With Specialties Details Why Pod Erasmo Jones MD Internal Medicine   [address] [place] [address] [address]  [place] [address] [facility]  [phone]          Discharge Medication List as of 4/26/2018 10:12 PM      CONTINUE these medications which have NOT CHANGED    Details   apixaban (ELIQUIS) 5 mg 10 mg orally twice daily for 7 days and then 5 mg orally twice daily  , No Print      chlorthalidone 25 mg tablet Take 1 tablet (25 mg total) by mouth daily, Starting Fri 4/27/2018, Normal      finasteride (PROSCAR) 5 mg tablet Take 5 mg by mouth daily  , Until Discontinued, Historical Med      fluticasone furoate-vilanterol (BREO ELLIPTA) Inhale 1 puff daily, Starting Thu 3/22/2018, Normal      !! LORazepam (ATIVAN) 0 5 mg tablet Take 0 5 mg by mouth 2 (two) times a day as needed  , Until Discontinued, Historical Med      !! LORazepam (ATIVAN) 0 5 mg tablet Take 1 tablet (0 5 mg total) by mouth daily at bedtime for 10 days, Starting u 4/26/2018, Until Sun 5/6/2018, Print      !! losartan (COZAAR) 100 MG tablet Take 1 tablet (100 mg total) by mouth daily, Starting Fri 4/27/2018, Print      !! losartan (COZAAR) 50 mg tablet Take 50 mg by mouth daily  , Until Discontinued, Historical Med      tamsulosin (FLOMAX) 0 4 mg Take 0 4 mg by mouth daily  , Until Discontinued, Historical Med      valACYclovir (VALTREX) 1,000 mg tablet Take 1,000 mg by mouth 3 (three) times a day, Historical Med       !! - Potential duplicate medications found  Please discuss with provider  No discharge procedures on file      ED Provider  Electronically Signed by           Goldie Parks MD  04/27/18 2816

## 2018-04-27 NOTE — DISCHARGE INSTRUCTIONS
Chronic Hypertension, Ambulatory Care   GENERAL INFORMATION:   Chronic hypertension  is a long-term condition in which your blood pressure (BP) is higher than normal  Your BP is the force of your blood moving against the walls of your arteries  Hypertension is a BP of 140/90 or higher  Common symptoms include the following:   · Headache     · Blurred vision    · Chest pain     · Dizziness or weakness     · Trouble breathing     · Nosebleeds  Seek immediate care for the following symptoms:   · Severe headache or vision loss    · Weakness in an arm or leg    · Confusion or difficulty speaking    · Discomfort in your chest that feels like squeezing, pressure, fullness, or pain    · Suddenly feeling lightheaded or trouble breathing    · Pain or discomfort in your back, neck, jaw, stomach, or arm  Treatment for chronic hypertension  may include medicine to lower your BP  You may also need to make lifestyle changes  Take your medicine exactly as directed  Manage chronic hypertension:   · Take your BP at home  Sit and rest for 5 minutes before you take your BP  Extend your arm and support it on a flat surface  Your arm should be at the same level as your heart  Follow the directions that came with your BP monitor  If possible, take at least 2 BP readings each time  Take your BP at least twice a day at the same times each day, such as morning and evening  Keep a log of your BP readings and bring it to your follow-up visits  · Eat less sodium (salt)  Do not add sodium to your food  Limit foods that are high in sodium, such as canned foods, potato chips, and cold cuts  Your healthcare provider may suggest that you follow the [address] [place] [address] Street  The plan is low in sodium, unhealthy fats, and total fat  It is high in potassium, calcium, and fiber  · Exercise regularly  Exercise at least 30 minutes per day, on most days of the week  This will help decrease your BP   Ask your healthcare provider about the best exercise plan for you  · Limit alcohol  Women should limit alcohol to 1 drink a day  Men should limit alcohol to 2 drinks a day  A drink of alcohol is 12 ounces of beer, 5 ounces of wine, or 1½ ounces of liquor  · Do not smoke  If you smoke, it is never too late to quit  Smoking can increase your BP  Smoking also worsens other health conditions you may have that can increase your risk for hypertension  Ask your healthcare provider for information if you need help quitting  Follow up with your healthcare provider as directed: You will need to return to have your BP checked and to have other lab tests done  Write down your questions so you remember to ask them during your visits  CARE AGREEMENT:   You have the right to help plan your care  Learn about your health condition and how it may be treated  Discuss treatment options with your caregivers to decide what care you want to receive  You always have the right to refuse treatment  The above information is an  only  It is not intended as medical advice for individual conditions or treatments  Talk to your doctor, nurse or pharmacist before following any medical regimen to see if it is safe and effective for you  © 2014 [address] is for End User's use only and may not be sold, redistributed or otherwise used for commercial purposes  All illustrations and images included in CareNotes® are the copyrighted property of A D A M , Inc  or Rasta Whitt

## 2018-06-05 ENCOUNTER — TRANSCRIBE ORDERS (OUTPATIENT)
Dept: ADMINISTRATIVE | Facility: HOSPITAL | Age: 68
End: 2018-06-05

## 2018-06-05 DIAGNOSIS — G93.3 POSTVIRAL FATIGUE SYNDROME: ICD-10-CM

## 2018-06-05 DIAGNOSIS — Z71.89 ENCOUNTER FOR CARDIAC RISK COUNSELING: Primary | ICD-10-CM

## 2018-06-18 ENCOUNTER — HOSPITAL ENCOUNTER (OUTPATIENT)
Dept: NON INVASIVE DIAGNOSTICS | Facility: HOSPITAL | Age: 68
Discharge: HOME | End: 2018-06-18
Attending: INTERNAL MEDICINE
Payer: MEDICARE

## 2018-06-18 ENCOUNTER — HOSPITAL ENCOUNTER (OUTPATIENT)
Dept: NUCLEAR MEDICINE | Facility: HOSPITAL | Age: 68
Discharge: HOME | End: 2018-06-18
Attending: INTERNAL MEDICINE
Payer: MEDICARE

## 2018-06-18 DIAGNOSIS — G93.3 POSTVIRAL FATIGUE SYNDROME: ICD-10-CM

## 2018-06-18 DIAGNOSIS — Z71.89 ENCOUNTER FOR CARDIAC RISK COUNSELING: ICD-10-CM

## 2018-06-18 LAB
CHEST PAIN STATEMENT: NORMAL
MAX DIASTOLIC BP: 77 MMHG
MAX HEART RATE: 127 BPM
MAX PREDICTED HEART RATE: 151 BPM
MAX. SYSTOLIC BP: 176 MMHG
PROTOCOL NAME: NORMAL
TARGET HR FORMULA: NORMAL
TEST INDICATION: NORMAL
TIME IN EXERCISE PHASE: NORMAL

## 2018-06-18 PROCEDURE — 93017 CV STRESS TEST TRACING ONLY: CPT

## 2018-06-18 PROCEDURE — 78452 HT MUSCLE IMAGE SPECT MULT: CPT

## 2018-06-18 PROCEDURE — A9502 TC99M TETROFOSMIN: HCPCS

## 2018-08-28 ENCOUNTER — TRANSCRIBE ORDERS (OUTPATIENT)
Dept: ADMINISTRATIVE | Facility: HOSPITAL | Age: 68
End: 2018-08-28

## 2018-08-28 DIAGNOSIS — S22.49XG CLOSED FRACTURE OF MULTIPLE RIBS WITH DELAYED HEALING, UNSPECIFIED LATERALITY, SUBSEQUENT ENCOUNTER: Primary | ICD-10-CM

## 2018-09-07 ENCOUNTER — HOSPITAL ENCOUNTER (OUTPATIENT)
Dept: CT IMAGING | Facility: HOSPITAL | Age: 68
Discharge: HOME | End: 2018-09-07
Payer: MEDICARE

## 2018-09-07 DIAGNOSIS — S22.49XG CLOSED FRACTURE OF MULTIPLE RIBS WITH DELAYED HEALING, UNSPECIFIED LATERALITY, SUBSEQUENT ENCOUNTER: ICD-10-CM

## 2018-09-07 PROCEDURE — 71250 CT THORAX DX C-: CPT

## 2019-01-08 ENCOUNTER — TRANSCRIBE ORDERS (OUTPATIENT)
Dept: ADMINISTRATIVE | Facility: HOSPITAL | Age: 69
End: 2019-01-08

## 2019-01-08 DIAGNOSIS — B33.8 RSV (RESPIRATORY SYNCYTIAL VIRUS INFECTION): Primary | ICD-10-CM

## 2019-01-08 DIAGNOSIS — R05.9 COUGH: ICD-10-CM

## 2019-01-10 ENCOUNTER — APPOINTMENT (OUTPATIENT)
Dept: LAB | Facility: MEDICAL CENTER | Age: 69
End: 2019-01-10
Payer: MEDICARE

## 2019-01-10 ENCOUNTER — APPOINTMENT (OUTPATIENT)
Dept: RADIOLOGY | Facility: MEDICAL CENTER | Age: 69
End: 2019-01-10
Payer: MEDICARE

## 2019-01-10 DIAGNOSIS — B33.8 RSV (RESPIRATORY SYNCYTIAL VIRUS INFECTION): ICD-10-CM

## 2019-01-10 DIAGNOSIS — R05.9 COUGH: ICD-10-CM

## 2019-01-10 LAB
ANION GAP SERPL CALCULATED.3IONS-SCNC: 8 MMOL/L (ref 4–13)
BASOPHILS # BLD AUTO: 0.05 THOUSANDS/ΜL (ref 0–0.1)
BASOPHILS NFR BLD AUTO: 1 % (ref 0–1)
BUN SERPL-MCNC: 17 MG/DL (ref 5–25)
CALCIUM SERPL-MCNC: 9.7 MG/DL (ref 8.3–10.1)
CHLORIDE SERPL-SCNC: 104 MMOL/L (ref 100–108)
CO2 SERPL-SCNC: 25 MMOL/L (ref 21–32)
CREAT SERPL-MCNC: 1.01 MG/DL (ref 0.6–1.3)
EOSINOPHIL # BLD AUTO: 0.28 THOUSAND/ΜL (ref 0–0.61)
EOSINOPHIL NFR BLD AUTO: 6 % (ref 0–6)
ERYTHROCYTE [DISTWIDTH] IN BLOOD BY AUTOMATED COUNT: 13.1 % (ref 11.6–15.1)
GFR SERPL CREATININE-BSD FRML MDRD: 76 ML/MIN/1.73SQ M
GLUCOSE SERPL-MCNC: 100 MG/DL (ref 65–140)
HCT VFR BLD AUTO: 51.7 % (ref 36.5–49.3)
HGB BLD-MCNC: 16.9 G/DL (ref 12–17)
IMM GRANULOCYTES # BLD AUTO: 0.01 THOUSAND/UL (ref 0–0.2)
IMM GRANULOCYTES NFR BLD AUTO: 0 % (ref 0–2)
LYMPHOCYTES # BLD AUTO: 1.65 THOUSANDS/ΜL (ref 0.6–4.47)
LYMPHOCYTES NFR BLD AUTO: 35 % (ref 14–44)
MCH RBC QN AUTO: 30.1 PG (ref 26.8–34.3)
MCHC RBC AUTO-ENTMCNC: 32.7 G/DL (ref 31.4–37.4)
MCV RBC AUTO: 92 FL (ref 82–98)
MONOCYTES # BLD AUTO: 0.51 THOUSAND/ΜL (ref 0.17–1.22)
MONOCYTES NFR BLD AUTO: 11 % (ref 4–12)
NEUTROPHILS # BLD AUTO: 2.17 THOUSANDS/ΜL (ref 1.85–7.62)
NEUTS SEG NFR BLD AUTO: 47 % (ref 43–75)
NRBC BLD AUTO-RTO: 0 /100 WBCS
PLATELET # BLD AUTO: 273 THOUSANDS/UL (ref 149–390)
PMV BLD AUTO: 9.6 FL (ref 8.9–12.7)
POTASSIUM SERPL-SCNC: 4.2 MMOL/L (ref 3.5–5.3)
RBC # BLD AUTO: 5.62 MILLION/UL (ref 3.88–5.62)
SODIUM SERPL-SCNC: 137 MMOL/L (ref 136–145)
WBC # BLD AUTO: 4.67 THOUSAND/UL (ref 4.31–10.16)

## 2019-01-10 PROCEDURE — 85025 COMPLETE CBC W/AUTO DIFF WBC: CPT

## 2019-01-10 PROCEDURE — 71046 X-RAY EXAM CHEST 2 VIEWS: CPT

## 2019-01-10 PROCEDURE — 80048 BASIC METABOLIC PNL TOTAL CA: CPT

## 2019-01-10 PROCEDURE — 36415 COLL VENOUS BLD VENIPUNCTURE: CPT

## 2019-03-21 ENCOUNTER — HOSPITAL ENCOUNTER (EMERGENCY)
Facility: HOSPITAL | Age: 69
Discharge: HOME | End: 2019-03-22
Attending: EMERGENCY MEDICINE | Admitting: EMERGENCY MEDICINE
Payer: MEDICARE

## 2019-03-21 DIAGNOSIS — I10 HIGH BLOOD PRESSURE: Primary | ICD-10-CM

## 2019-03-21 DIAGNOSIS — E04.1 THYROID NODULE: ICD-10-CM

## 2019-03-21 DIAGNOSIS — M54.2 NECK PAIN: ICD-10-CM

## 2019-03-21 DIAGNOSIS — R51.9 HEADACHE: ICD-10-CM

## 2019-03-21 PROCEDURE — 99284 EMERGENCY DEPT VISIT MOD MDM: CPT

## 2019-03-22 ENCOUNTER — APPOINTMENT (EMERGENCY)
Dept: CT IMAGING | Facility: HOSPITAL | Age: 69
End: 2019-03-22
Payer: MEDICARE

## 2019-03-22 VITALS
OXYGEN SATURATION: 94 % | TEMPERATURE: 97.9 F | SYSTOLIC BLOOD PRESSURE: 133 MMHG | RESPIRATION RATE: 16 BRPM | DIASTOLIC BLOOD PRESSURE: 78 MMHG | HEART RATE: 65 BPM | WEIGHT: 229.72 LBS | BODY MASS INDEX: 33.92 KG/M2

## 2019-03-22 LAB
ANION GAP SERPL CALCULATED.3IONS-SCNC: 6 MMOL/L (ref 4–13)
APTT PPP: 35 SECONDS (ref 26–38)
ATRIAL RATE: 69 BPM
BACTERIA UR QL AUTO: ABNORMAL /HPF
BASOPHILS # BLD AUTO: 0.06 THOUSANDS/ΜL (ref 0–0.1)
BASOPHILS NFR BLD AUTO: 1 % (ref 0–1)
BILIRUB UR QL STRIP: NEGATIVE
BUN SERPL-MCNC: 22 MG/DL (ref 5–25)
CALCIUM SERPL-MCNC: 10.1 MG/DL (ref 8.3–10.1)
CHLORIDE SERPL-SCNC: 103 MMOL/L (ref 100–108)
CLARITY UR: CLEAR
CO2 SERPL-SCNC: 27 MMOL/L (ref 21–32)
COLOR UR: YELLOW
CREAT SERPL-MCNC: 0.95 MG/DL (ref 0.6–1.3)
EOSINOPHIL # BLD AUTO: 0.51 THOUSAND/ΜL (ref 0–0.61)
EOSINOPHIL NFR BLD AUTO: 5 % (ref 0–6)
ERYTHROCYTE [DISTWIDTH] IN BLOOD BY AUTOMATED COUNT: 13.8 % (ref 11.6–15.1)
GFR SERPL CREATININE-BSD FRML MDRD: 81 ML/MIN/1.73SQ M
GLUCOSE SERPL-MCNC: 104 MG/DL (ref 65–140)
GLUCOSE UR STRIP-MCNC: NEGATIVE MG/DL
HCT VFR BLD AUTO: 48.7 % (ref 36.5–49.3)
HGB BLD-MCNC: 16 G/DL (ref 12–17)
HGB UR QL STRIP.AUTO: ABNORMAL
IMM GRANULOCYTES # BLD AUTO: 0.04 THOUSAND/UL (ref 0–0.2)
IMM GRANULOCYTES NFR BLD AUTO: 0 % (ref 0–2)
INR PPP: 1.13 (ref 0.86–1.17)
KETONES UR STRIP-MCNC: NEGATIVE MG/DL
LEUKOCYTE ESTERASE UR QL STRIP: NEGATIVE
LYMPHOCYTES # BLD AUTO: 2.1 THOUSANDS/ΜL (ref 0.6–4.47)
LYMPHOCYTES NFR BLD AUTO: 22 % (ref 14–44)
MCH RBC QN AUTO: 31.4 PG (ref 26.8–34.3)
MCHC RBC AUTO-ENTMCNC: 32.9 G/DL (ref 31.4–37.4)
MCV RBC AUTO: 96 FL (ref 82–98)
MONOCYTES # BLD AUTO: 0.63 THOUSAND/ΜL (ref 0.17–1.22)
MONOCYTES NFR BLD AUTO: 7 % (ref 4–12)
NEUTROPHILS # BLD AUTO: 6.36 THOUSANDS/ΜL (ref 1.85–7.62)
NEUTS SEG NFR BLD AUTO: 65 % (ref 43–75)
NITRITE UR QL STRIP: NEGATIVE
NON-SQ EPI CELLS URNS QL MICRO: ABNORMAL /HPF
NRBC BLD AUTO-RTO: 0 /100 WBCS
P AXIS: 47 DEGREES
PH UR STRIP.AUTO: 6 [PH] (ref 4.5–8)
PLATELET # BLD AUTO: 297 THOUSANDS/UL (ref 149–390)
PMV BLD AUTO: 9.5 FL (ref 8.9–12.7)
POTASSIUM SERPL-SCNC: 4 MMOL/L (ref 3.5–5.3)
PR INTERVAL: 168 MS
PROT UR STRIP-MCNC: NEGATIVE MG/DL
PROTHROMBIN TIME: 14.6 SECONDS (ref 11.8–14.2)
QRS AXIS: -30 DEGREES
QRSD INTERVAL: 98 MS
QT INTERVAL: 396 MS
QTC INTERVAL: 424 MS
RBC # BLD AUTO: 5.09 MILLION/UL (ref 3.88–5.62)
RBC #/AREA URNS AUTO: ABNORMAL /HPF
SODIUM SERPL-SCNC: 136 MMOL/L (ref 136–145)
SP GR UR STRIP.AUTO: 1.01 (ref 1–1.03)
T WAVE AXIS: 26 DEGREES
TROPONIN I SERPL-MCNC: <0.02 NG/ML
UROBILINOGEN UR QL STRIP.AUTO: 0.2 E.U./DL
VENTRICULAR RATE: 69 BPM
WBC # BLD AUTO: 9.7 THOUSAND/UL (ref 4.31–10.16)
WBC #/AREA URNS AUTO: ABNORMAL /HPF

## 2019-03-22 PROCEDURE — 81001 URINALYSIS AUTO W/SCOPE: CPT

## 2019-03-22 PROCEDURE — 93005 ELECTROCARDIOGRAM TRACING: CPT

## 2019-03-22 PROCEDURE — 80048 BASIC METABOLIC PNL TOTAL CA: CPT | Performed by: EMERGENCY MEDICINE

## 2019-03-22 PROCEDURE — 36415 COLL VENOUS BLD VENIPUNCTURE: CPT | Performed by: EMERGENCY MEDICINE

## 2019-03-22 PROCEDURE — 85730 THROMBOPLASTIN TIME PARTIAL: CPT | Performed by: EMERGENCY MEDICINE

## 2019-03-22 PROCEDURE — 85610 PROTHROMBIN TIME: CPT | Performed by: EMERGENCY MEDICINE

## 2019-03-22 PROCEDURE — 93010 ELECTROCARDIOGRAM REPORT: CPT | Performed by: INTERNAL MEDICINE

## 2019-03-22 PROCEDURE — 96374 THER/PROPH/DIAG INJ IV PUSH: CPT

## 2019-03-22 PROCEDURE — 85025 COMPLETE CBC W/AUTO DIFF WBC: CPT | Performed by: EMERGENCY MEDICINE

## 2019-03-22 PROCEDURE — 96375 TX/PRO/DX INJ NEW DRUG ADDON: CPT

## 2019-03-22 PROCEDURE — 70498 CT ANGIOGRAPHY NECK: CPT

## 2019-03-22 PROCEDURE — 84484 ASSAY OF TROPONIN QUANT: CPT | Performed by: EMERGENCY MEDICINE

## 2019-03-22 PROCEDURE — 70496 CT ANGIOGRAPHY HEAD: CPT

## 2019-03-22 RX ORDER — ONDANSETRON 2 MG/ML
4 INJECTION INTRAMUSCULAR; INTRAVENOUS ONCE
Status: COMPLETED | OUTPATIENT
Start: 2019-03-22 | End: 2019-03-22

## 2019-03-22 RX ADMIN — ONDANSETRON 4 MG: 2 INJECTION INTRAMUSCULAR; INTRAVENOUS at 00:29

## 2019-03-22 RX ADMIN — IOHEXOL 100 ML: 350 INJECTION, SOLUTION INTRAVENOUS at 01:14

## 2019-03-22 RX ADMIN — MORPHINE SULFATE 2 MG: 2 INJECTION, SOLUTION INTRAMUSCULAR; INTRAVENOUS at 00:32

## 2019-03-22 NOTE — ED PROVIDER NOTES
History  Chief Complaint   Patient presents with    High Blood Pressure     pt reports bp 190/100 at home, took catapress, pt reports neck pain and dizziness, pt denies CP     59-year-old male presents for evaluation of multiple complaints  Patient states that he has been having high blood pressure over the past several weeks and decide of blood pressure medications changed  He states this evening he had sharp pain in his neck th radiated into his he  States the pain is constant, moderate severity, without modifying factors  He states that it feels like he is slow to react in confused  However when patient is asked questions he is alert and oriented x4  Patient denies chest pain, shortness of breath, nausea vomiting fevers, chills, vertigo, focal neuro deficits or weakness, difficulty with ambulation, abdominal pain, bowel/bladder complaints  History provided by:  Patient      Prior to Admission Medications   Prescriptions Last Dose Informant Patient Reported? Taking? apixaban (ELIQUIS) 5 mg   No Yes   Sig: 10 mg orally twice daily for 7 days and then 5 mg orally twice daily  Patient taking differently: 5 mg 2 (two) times a day     chlorthalidone 25 mg tablet   No Yes   Sig: Take 1 tablet (25 mg total) by mouth daily   finasteride (PROSCAR) 5 mg tablet   Yes Yes   Sig: Take 5 mg by mouth daily  losartan (COZAAR) 100 MG tablet   No Yes   Sig: Take 1 tablet (100 mg total) by mouth daily      Facility-Administered Medications: None       Past Medical History:   Diagnosis Date    Allergic rhinitis     Arthritis     COPD, mild (HCC)     GERD (gastroesophageal reflux disease)     History of DVT (deep vein thrombosis)     Hypertension     Kidney stones     Nephrolithiasis     Obesity (BMI 30-39  9)     Osteoarthritis     PE (pulmonary embolism) 12/26/1999    Pneumonia 1/13/2016    Prostate hypertrophy     Rib pain     7,8,9 ribs on Left chest wall with Lsided hernia    Wears glasses reading       Past Surgical History:   Procedure Laterality Date    HERNIA REPAIR      b/l inguinal hernia with mesh    KIDNEY STONE SURGERY      KNEE ARTHROPLASTY Left     TKR    ME KNEE SCOPE,SHAVE ARTICULAR CART Right 2016    Procedure: ARTHROSCOPY KNEE WITH PARTIAL MEDIAL MENISECTOMY;  Surgeon: Ana Cristina Carrasquillo MD;  Location: [facility];  Service: Orthopedics       Family History   Problem Relation Age of Onset    Heart disease Mother     Cancer Mother     Cancer Father      I have reviewed and agree with the history as documented  Social History     Tobacco Use    Smoking status: Former Smoker     Packs/day:      Years: 20      Pack years: 20      Types: Cigarettes     Last attempt to quit:      Years since quittin 2    Smokeless tobacco: Never Used   Substance Use Topics    Alcohol use: Yes     Comment: 1 daily    Drug use: No        Review of Systems   Constitutional: Negative for activity change, appetite change, fatigue and fever  HENT: Negative for congestion, dental problem, ear pain, rhinorrhea and sore throat  Eyes: Negative for pain and redness  Respiratory: Negative for chest tightness, shortness of breath and wheezing  Cardiovascular: Negative for chest pain and palpitations  Gastrointestinal: Negative for abdominal pain, blood in stool, constipation, diarrhea, nausea and vomiting  Endocrine: Negative for cold intolerance and heat intolerance  Genitourinary: Negative for dysuria, frequency and hematuria  Musculoskeletal: Positive for neck pain  Negative for arthralgias and myalgias  Skin: Negative for color change, pallor and rash  Neurological: Negative for weakness and numbness  Hematological: Does not bruise/bleed easily  Psychiatric/Behavioral: Positive for confusion  Negative for agitation, hallucinations and suicidal ideas  Physical Exam  Physical Exam   Constitutional: He is oriented to person, place, and time   He appears well-developed and well-nourished  HENT:   Mouth/Throat: No oropharyngeal exudate  TMs normal bilaterally no pharyngeal erythema no rhinorrhea nontender palpation of sinuses, normal looking turbinates   Eyes: Conjunctivae and EOM are normal    Neck: Normal range of motion  Neck supple  No meningeal signs   Cardiovascular: Normal rate, regular rhythm, normal heart sounds and intact distal pulses  Pulmonary/Chest: Effort normal and breath sounds normal  No respiratory distress  He has no wheezes  He has no rales  He exhibits no tenderness  Abdominal: Soft  Bowel sounds are normal  He exhibits no distension and no mass  There is no tenderness  No hernia  No cvat   Musculoskeletal: Normal range of motion  He exhibits no edema  Lymphadenopathy:     He has no cervical adenopathy  Neurological: He is alert and oriented to person, place, and time  No cranial nerve deficit  Skin: No rash noted  No erythema  No edema   Psychiatric: He has a normal mood and affect  His behavior is normal    Nursing note and vitals reviewed        Vital Signs  ED Triage Vitals   Temperature Pulse Respirations Blood Pressure SpO2   03/21/19 2357 03/21/19 2357 03/21/19 2357 03/21/19 2357 03/21/19 2357   97 9 °F (36 6 °C) 74 18 (!) 182/85 100 %      Temp src Heart Rate Source Patient Position - Orthostatic VS BP Location FiO2 (%)   -- 03/22/19 0138 03/22/19 0254 03/22/19 0254 --    Monitor Lying Right arm       Pain Score       03/21/19 2357       4           Vitals:    03/21/19 2357 03/22/19 0015 03/22/19 0138 03/22/19 0254   BP: (!) 182/85 169/70 146/91 133/78   Pulse: 74 74 70 65   Patient Position - Orthostatic VS:    Lying         Visual Acuity      ED Medications  Medications   morphine injection 2 mg (2 mg Intravenous Given 3/22/19 0032)   ondansetron (ZOFRAN) injection 4 mg (4 mg Intravenous Given 3/22/19 0029)   iohexol (OMNIPAQUE) 350 MG/ML injection (SINGLE-DOSE) 100 mL (100 mL Intravenous Given 3/22/19 0114) Diagnostic Studies  Results Reviewed     Procedure Component Value Units Date/Time    Urine Microscopic [[id number]]  (Abnormal) Collected:  03/22/19 0131    Lab Status:  Final result Specimen:  Urine, Clean Catch Updated:  03/22/19 0147     RBC, UA 2-4 /hpf      WBC, UA None Seen /hpf      Epithelial Cells Occasional /hpf      Bacteria, UA None Seen /hpf     POCT urinalysis dipstick [[id number]]  (Abnormal) Resulted:  03/22/19 0129    Lab Status:  Final result Specimen:  Urine Updated:  03/22/19 0130    ED Urine Macroscopic [[id number]]  (Abnormal) Collected:  03/22/19 0131    Lab Status:  Final result Specimen:  Urine Updated:  03/22/19 0128     Color, UA Yellow     Clarity, UA Clear     pH, UA 6 0     Leukocytes, UA Negative     Nitrite, UA Negative     Protein, UA Negative mg/dl      Glucose, UA Negative mg/dl      Ketones, UA Negative mg/dl      Urobilinogen, UA 0 2 E U /dl      Bilirubin, UA Negative     Blood, UA Small     Specific [place], UA 1 015    Narrative:       CLINITEK RESULT    Troponin I [[id number]]  (Normal) Collected:  03/22/19 0027    Lab Status:  Final result Specimen:  Blood from Arm, Left Updated:  03/22/19 0101     Troponin I <0 02 ng/mL     Protime-INR [[id number]]  (Abnormal) Collected:  03/22/19 0027    Lab Status:  Final result Specimen:  Blood from Arm, Left Updated:  03/22/19 0058     Protime 14 6 seconds      INR 1 13    APTT [[id number]]  (Normal) Collected:  03/22/19 0027    Lab Status:  Final result Specimen:  Blood from Arm, Left Updated:  03/22/19 0058     PTT 35 seconds     Basic metabolic panel [[id number]] Collected:  03/22/19 0027    Lab Status:  Final result Specimen:  Blood from Arm, Left Updated:  03/22/19 0053     Sodium 136 mmol/L      Potassium 4 0 mmol/L      Chloride 103 mmol/L      CO2 27 mmol/L      ANION GAP 6 mmol/L      BUN 22 mg/dL      Creatinine 0 95 mg/dL      Glucose 104 mg/dL      Calcium 10 1 mg/dL      eGFR 81 ml/min/1 73sq m     Narrative:       Consolidated [place] Kid[facility] Disease Education Program recommendations are as follows:  GFR calculation is accurate only with a steady state creatinine  Chronic Kidney disease less than 60 ml/min/1 73 sq  meters  Kidney failure less than 15 ml/min/1 73 sq  meters  CBC and differential [[id number]] Collected:  03/22/19 0027    Lab Status:  Final result Specimen:  Blood from Arm, Left Updated:  03/22/19 0044     WBC 9 70 Thousand/uL      RBC 5 09 Million/uL      Hemoglobin 16 0 g/dL      Hematocrit 48 7 %      MCV 96 fL      MCH 31 4 pg      MCHC 32 9 g/dL      RDW 13 8 %      MPV 9 5 fL      Platelets 649 Thousands/uL      nRBC 0 /100 WBCs      Neutrophils Relative 65 %      Immat GRANS % 0 %      Lymphocytes Relative 22 %      Monocytes Relative 7 %      Eosinophils Relative 5 %      Basophils Relative 1 %      Neutrophils Absolute 6 36 Thousands/µL      Immature Grans Absolute 0 04 Thousand/uL      Lymphocytes Absolute 2 10 Thousands/µL      Monocytes Absolute 0 63 Thousand/µL      Eosinophils Absolute 0 51 Thousand/µL      Basophils Absolute 0 06 Thousands/µL                  CTA head and neck with and without contrast   Final Result by Glynn Beltrán MD (03/22 0220)      No evidence of acute intracranial abnormality  No evidence of hemodynamically significant stenosis  No evidence of dissection  No evidence of vascular cut off sign  There is multilevel degenerative change of the cervical spine  Small thyroid nodules  Please see discussion                    Workstation performed: [id number]                    Procedures  ECG 12 Lead Documentation  Date/Time: 3/22/2019 12:40 AM  Performed by: Golden Adams MD  Authorized by: Golden Adams MD     ECG reviewed by me, the ED Provider: yes    Patient location:  ED  Rate:     ECG rate:  69    ECG rate assessment: normal    Rhythm:     Rhythm: sinus rhythm    Ectopy:     Ectopy: none    QRS:     QRS axis:  Left    QRS intervals:  Normal  Conduction:     Conduction: normal ST segments:     ST segments:  Normal  T waves:     T waves: normal             Phone Contacts  ED Phone Contact    ED Course             Stroke Assessment     Row Name 03/22/19 0017             NIH Stroke Scale    Interval  Baseline      Level of Consciousness (1a )  0      LOC Questions (1b )  0      LOC Commands (1c )  0      Best Gaze (2 )  0      Visual (3 )  0      Facial Palsy (4 )  0      Motor Arm, Left (5a )  0      Motor Arm, Right (5b )  0      Motor Leg, Left (6a )  0      Motor Leg, Right (6b )  0      Limb Ataxia (7 )  0      Sensory (8 )  0      Best Language (9 )  0      Dysarthria (10 )  0      Extinction and Inattention (11 ) (Formerly Neglect)  0      Total  0          First Filed Value   TPA Decision  Patient not a TPA candidate  MDM  Number of Diagnoses or Management Options  Diagnosis management comments: Htn, neck pain, confusion-will cta head/neck to r/o acute pathology, ekg/trop, labs, reassess      Disposition  Final diagnoses:   High blood pressure   Neck pain   Headache   Thyroid nodule     Time reflects when diagnosis was documented in both MDM as applicable and the Disposition within this note     Time User Action Codes Description Comment    3/22/2019  1:46 AM SmeriglioVan Citrin Add [I10] High blood pressure     3/22/2019  1:47 AM Vilinda Leventhal Add [M54 2] Neck pain     3/22/2019  1:47 AM Vilinda Leventhal Add [R51] Headache     3/22/2019  2:23 AM SmeriglioMohinderan Citrin Add [E04 1] Thyroid nodule       ED Disposition     ED Disposition Condition Date/Time Comment    Discharge Stable Fri Mar 22, 2019  2:23 AM Em Colin discharge to home/self care              Follow-up Information     Follow up With Specialties Details Why Guille Jones MD Internal Medicine Call today To schedule an appointment as soon as you can [address] [place] [address]  [phone]            Discharge Medication List as of 3/22/2019  2:24 AM      CONTINUE these medications which have NOT CHANGED    Details   apixaban (ELIQUIS) 5 mg 10 mg orally twice daily for 7 days and then 5 mg orally twice daily  , No Print      chlorthalidone 25 mg tablet Take 1 tablet (25 mg total) by mouth daily, Starting Fri 4/27/2018, Normal      finasteride (PROSCAR) 5 mg tablet Take 5 mg by mouth daily  , Until Discontinued, Historical Med      losartan (COZAAR) 100 MG tablet Take 1 tablet (100 mg total) by mouth daily, Starting Fri 4/27/2018, Print           No discharge procedures on file      ED Provider  Electronically Signed by           Michelle Grijalva MD  03/22/19 4462

## 2019-04-01 ENCOUNTER — HOSPITAL ENCOUNTER (OUTPATIENT)
Dept: RADIOLOGY | Facility: HOSPITAL | Age: 69
Discharge: HOME | End: 2019-04-01
Attending: FAMILY MEDICINE
Payer: MEDICARE

## 2019-04-01 ENCOUNTER — TRANSCRIBE ORDERS (OUTPATIENT)
Dept: ADMINISTRATIVE | Facility: HOSPITAL | Age: 69
End: 2019-04-01

## 2019-04-01 DIAGNOSIS — M54.2 CERVICALGIA: ICD-10-CM

## 2019-04-01 DIAGNOSIS — M54.2 CERVICALGIA: Primary | ICD-10-CM

## 2019-04-01 PROCEDURE — 72040 X-RAY EXAM NECK SPINE 2-3 VW: CPT

## 2019-11-13 ENCOUNTER — OFFICE VISIT (OUTPATIENT)
Dept: PULMONOLOGY | Facility: CLINIC | Age: 69
End: 2019-11-13
Payer: MEDICARE

## 2019-11-13 VITALS
DIASTOLIC BLOOD PRESSURE: 88 MMHG | BODY MASS INDEX: 36.91 KG/M2 | TEMPERATURE: 97.6 F | HEART RATE: 77 BPM | WEIGHT: 249.2 LBS | OXYGEN SATURATION: 98 % | SYSTOLIC BLOOD PRESSURE: 140 MMHG | RESPIRATION RATE: 16 BRPM | HEIGHT: 69 IN

## 2019-11-13 DIAGNOSIS — R06.2 WHEEZING: ICD-10-CM

## 2019-11-13 DIAGNOSIS — R05.9 COUGH: ICD-10-CM

## 2019-11-13 DIAGNOSIS — Z23 ENCOUNTER FOR IMMUNIZATION: ICD-10-CM

## 2019-11-13 DIAGNOSIS — J20.9 ACUTE BRONCHITIS: Primary | ICD-10-CM

## 2019-11-13 PROCEDURE — 99214 OFFICE O/P EST MOD 30 MIN: CPT | Performed by: INTERNAL MEDICINE

## 2019-11-13 PROCEDURE — G0008 ADMIN INFLUENZA VIRUS VAC: HCPCS

## 2019-11-13 PROCEDURE — 90662 IIV NO PRSV INCREASED AG IM: CPT

## 2019-11-13 RX ORDER — FLUTICASONE FUROATE AND VILANTEROL 200; 25 UG/1; UG/1
1 POWDER RESPIRATORY (INHALATION) DAILY
Qty: 1 INHALER | Refills: 5 | Status: SHIPPED | OUTPATIENT
Start: 2019-11-13 | End: 2020-04-01 | Stop reason: SDUPTHER

## 2019-11-13 RX ORDER — TAMSULOSIN HYDROCHLORIDE 0.4 MG/1
CAPSULE ORAL
COMMUNITY

## 2019-11-13 RX ORDER — MOXIFLOXACIN 5 MG/ML
SOLUTION/ DROPS OPHTHALMIC
COMMUNITY
End: 2022-07-28 | Stop reason: HOSPADM

## 2019-11-13 RX ORDER — AMLODIPINE BESYLATE 2.5 MG/1
2.5 TABLET ORAL
COMMUNITY

## 2019-11-13 NOTE — PROGRESS NOTES
Office Progress Note - Pulmonary    Jamar Parikh 78 y o  male MRN: [id number]    Encounter: [id number]      Assessment:   Acute bronchitis   Allergic rhinitis   Cough   Wheezing   Obesity past    Plan:     Breo 200/25, 1 once a day   Claritin once a day   Saline nasal and sinus rinse as needed   Influenza vaccine  Discussion:   The patient has post bronchitic cough and reactive airway causing wheezing  This is caused by his acute bronchitis  I have started him on Breo 200/25, 1 inhalation once a day  I have provided him with samples and showed him how to use the Ellipta device appropriately  He also has allergic rhinitis with postnasal drip  He will use Claritin once a day and saline nasal and sinus rinse as needed  He received the influenza vaccine in the office today  I will see on as needed basis per      Subjective: The patient is here for a sick visit  About a month ago he started having upper respiratory symptoms including nasal congestion and cough and wheezing  He uses saline nasal sinus rhythm was as well as had left over Breo which he used for 14 days  He has stopped using the Breo about 10 days ago  When he was taking the Breo his symptoms improved  Now he started having cough back about 3 or 4 days ago  This progressively worsening  He denied any fever or chills  Denied any chest pain  He has good appetite and denies weight loss  He sleeps in a recliner because of his ribs  Review of systems:  A 12 point system review is done and aside from what is stated above the rest of the review of systems is negative  Family history and social history are reviewed  Medications list is reviewed  Vitals: Blood pressure 140/88, pulse 77, temperature 97 6 °F (36 4 °C), temperature source Tympanic, resp  rate 16, height 5' 9" (1 753 m), weight 113 kg (249 lb 3 2 oz), SpO2 98 %  ,     Physical Exam  Gen: Awake, alert, oriented x 3, no acute distress  HEENT: Mucous membranes moist, no oral lesions, no thrush  NECK: No accessory muscle use, JVP not elevated  Cardiac: Regular, single S1, single S2, no murmurs, no rubs, no gallops  Lungs:  Clear breath sounds  Transmitted wheezing from the upper airways  Abdomen: normoactive bowel sounds, soft nontender, nondistended, no rebound or rigidity, no guarding  Extremities: no cyanosis, no clubbing, no edema  Neuro:  Grossly nonfocal   Skin:  No rash      Lab Results   Component Value Date    SODIUM 136 03/22/2019    K 4 0 03/22/2019     03/22/2019    CO2 27 03/22/2019    BUN 22 03/22/2019    CREATININE 0 95 03/22/2019    GLUC 104 03/22/2019    CALCIUM 10 1 03/22/2019

## 2020-04-01 DIAGNOSIS — R06.2 WHEEZING: ICD-10-CM

## 2020-04-01 DIAGNOSIS — J20.9 ACUTE BRONCHITIS: ICD-10-CM

## 2020-04-01 DIAGNOSIS — R05.9 COUGH: ICD-10-CM

## 2020-04-01 RX ORDER — FLUTICASONE FUROATE AND VILANTEROL 200; 25 UG/1; UG/1
1 POWDER RESPIRATORY (INHALATION) DAILY
Qty: 1 INHALER | Refills: 5 | Status: SHIPPED | OUTPATIENT
Start: 2020-04-01 | End: 2021-11-30 | Stop reason: SDUPTHER

## 2020-06-17 ENCOUNTER — OFFICE VISIT (OUTPATIENT)
Dept: PODIATRY | Facility: CLINIC | Age: 70
End: 2020-06-17
Payer: MEDICARE

## 2020-06-17 VITALS — WEIGHT: 246 LBS | HEIGHT: 69 IN | BODY MASS INDEX: 36.43 KG/M2

## 2020-06-17 DIAGNOSIS — M79.671 RIGHT FOOT PAIN: ICD-10-CM

## 2020-06-17 DIAGNOSIS — M72.2 PLANTAR FASCIITIS: Primary | ICD-10-CM

## 2020-06-17 PROCEDURE — 99214 OFFICE O/P EST MOD 30 MIN: CPT | Performed by: PODIATRIST

## 2020-06-17 PROCEDURE — 20550 NJX 1 TENDON SHEATH/LIGAMENT: CPT | Performed by: PODIATRIST

## 2020-06-17 RX ORDER — BUPIVACAINE HYDROCHLORIDE 5 MG/ML
1 INJECTION, SOLUTION EPIDURAL; INTRACAUDAL ONCE
Status: COMPLETED | OUTPATIENT
Start: 2020-06-17 | End: 2020-06-17

## 2020-06-17 RX ORDER — MELOXICAM 15 MG/1
15 TABLET ORAL DAILY
Qty: 30 TABLET | Refills: 0 | Status: SHIPPED | OUTPATIENT
Start: 2020-06-17 | End: 2020-07-17

## 2020-06-17 RX ORDER — LIDOCAINE HYDROCHLORIDE 10 MG/ML
1 INJECTION, SOLUTION EPIDURAL; INFILTRATION; INTRACAUDAL; PERINEURAL ONCE
Status: COMPLETED | OUTPATIENT
Start: 2020-06-17 | End: 2020-06-17

## 2020-06-17 RX ORDER — TRIAMCINOLONE ACETONIDE 40 MG/ML
20 INJECTION, SUSPENSION INTRA-ARTICULAR; INTRAMUSCULAR ONCE
Status: COMPLETED | OUTPATIENT
Start: 2020-06-17 | End: 2020-06-17

## 2020-06-17 RX ADMIN — TRIAMCINOLONE ACETONIDE 20 MG: 40 INJECTION, SUSPENSION INTRA-ARTICULAR; INTRAMUSCULAR at 15:04

## 2020-06-17 RX ADMIN — BUPIVACAINE HYDROCHLORIDE 1 ML: 5 INJECTION, SOLUTION EPIDURAL; INTRACAUDAL at 15:04

## 2020-06-17 RX ADMIN — LIDOCAINE HYDROCHLORIDE 1 ML: 10 INJECTION, SOLUTION EPIDURAL; INFILTRATION; INTRACAUDAL; PERINEURAL at 15:04

## 2020-07-15 ENCOUNTER — OFFICE VISIT (OUTPATIENT)
Dept: PODIATRY | Facility: CLINIC | Age: 70
End: 2020-07-15
Payer: MEDICARE

## 2020-07-15 VITALS — BODY MASS INDEX: 34.96 KG/M2 | WEIGHT: 236 LBS | HEIGHT: 69 IN | TEMPERATURE: 99.4 F

## 2020-07-15 DIAGNOSIS — S90.821A BLISTER OF RIGHT FOOT, INITIAL ENCOUNTER: Primary | ICD-10-CM

## 2020-07-15 PROCEDURE — 99213 OFFICE O/P EST LOW 20 MIN: CPT | Performed by: PODIATRIST

## 2020-07-15 NOTE — PROGRESS NOTES
Assessment/Plan:    Discussed treatment options for blisters  Lanced blister right heel leading to serous drainage followed by bacitracin dressing  Discussed proper shoe gear  Recommended 2nd skin in order to relieve the friction that is developing  No treatment needed for plantar fascial pain right heel  Patient to discontinue meloxicam     No problem-specific Assessment & Plan notes found for this encounter  Diagnoses and all orders for this visit:    Blister of right foot, initial encounter          Subjective:      Patient ID: Hazel Braxton is a 69 y o  male  HPI     Patient presents for assessment  Patient was treated for plantar fasciitis of the right heel with cortisone injection and meloxicam at last visit  He has no heel pain at this time  However, patient is developing blisters on the back of each heel  Blister on the back of the right heel occurred only over the past few days  The following portions of the patient's history were reviewed and updated as appropriate: allergies, current medications, past family history, past medical history, past social history, past surgical history and problem list     Review of Systems   Respiratory: Negative  Cardiovascular:        History of cardiac disorder   Gastrointestinal: Negative  Musculoskeletal: Negative  Skin: Positive for wound  Objective:      Temp 99 4 °F (37 4 °C)   Ht 5' 9" (1 753 m)   Wt 107 kg (236 lb)   BMI 34 85 kg/m²          Physical Exam   Constitutional: He is oriented to person, place, and time  Cardiovascular: Regular rhythm and intact distal pulses  Musculoskeletal: He exhibits no tenderness  No pain with palpation medial aspect right heel at fascia insertion  Neurological: He is alert and oriented to person, place, and time  Skin:   Retrocalcaneal blister present right heel  No evidence of infection  A resolved blister noted back of left heel

## 2020-10-28 ENCOUNTER — OFFICE VISIT (OUTPATIENT)
Dept: PODIATRY | Facility: CLINIC | Age: 70
End: 2020-10-28
Payer: MEDICARE

## 2020-10-28 VITALS — BODY MASS INDEX: 31.7 KG/M2 | WEIGHT: 214 LBS | HEIGHT: 69 IN

## 2020-10-28 DIAGNOSIS — M79.675 PAIN IN TOE OF LEFT FOOT: ICD-10-CM

## 2020-10-28 DIAGNOSIS — L60.0 INGROWN TOENAIL: Primary | ICD-10-CM

## 2020-10-28 PROCEDURE — 99212 OFFICE O/P EST SF 10 MIN: CPT | Performed by: PODIATRIST

## 2021-03-10 DIAGNOSIS — Z23 ENCOUNTER FOR IMMUNIZATION: ICD-10-CM

## 2021-11-30 DIAGNOSIS — R05.9 COUGH: ICD-10-CM

## 2021-11-30 DIAGNOSIS — R06.2 WHEEZING: ICD-10-CM

## 2021-11-30 DIAGNOSIS — J20.9 ACUTE BRONCHITIS: ICD-10-CM

## 2021-11-30 RX ORDER — FLUTICASONE FUROATE AND VILANTEROL 200; 25 UG/1; UG/1
1 POWDER RESPIRATORY (INHALATION) DAILY
Qty: 60 BLISTER | Refills: 0 | Status: SHIPPED | OUTPATIENT
Start: 2021-11-30 | End: 2021-12-02

## 2021-12-02 DIAGNOSIS — J20.9 ACUTE BRONCHITIS: ICD-10-CM

## 2021-12-02 DIAGNOSIS — R05.9 COUGH: ICD-10-CM

## 2021-12-02 DIAGNOSIS — R06.2 WHEEZING: ICD-10-CM

## 2021-12-09 ENCOUNTER — OFFICE VISIT (OUTPATIENT)
Dept: PULMONOLOGY | Facility: CLINIC | Age: 71
End: 2021-12-09
Payer: MEDICARE

## 2021-12-09 VITALS
HEART RATE: 70 BPM | RESPIRATION RATE: 16 BRPM | OXYGEN SATURATION: 96 % | DIASTOLIC BLOOD PRESSURE: 60 MMHG | SYSTOLIC BLOOD PRESSURE: 122 MMHG | HEIGHT: 69 IN | BODY MASS INDEX: 33.03 KG/M2 | WEIGHT: 223 LBS

## 2021-12-09 DIAGNOSIS — E66.09 CLASS 1 OBESITY DUE TO EXCESS CALORIES WITHOUT SERIOUS COMORBIDITY WITH BODY MASS INDEX (BMI) OF 32.0 TO 32.9 IN ADULT: ICD-10-CM

## 2021-12-09 DIAGNOSIS — J45.40 MODERATE PERSISTENT ASTHMA WITHOUT COMPLICATION: Primary | ICD-10-CM

## 2021-12-09 DIAGNOSIS — J30.89 NON-SEASONAL ALLERGIC RHINITIS, UNSPECIFIED TRIGGER: ICD-10-CM

## 2021-12-09 PROCEDURE — 99214 OFFICE O/P EST MOD 30 MIN: CPT | Performed by: INTERNAL MEDICINE

## 2022-03-21 ENCOUNTER — TELEPHONE (OUTPATIENT)
Dept: PULMONOLOGY | Facility: CLINIC | Age: 72
End: 2022-03-21

## 2022-03-21 DIAGNOSIS — R05.9 COUGH: ICD-10-CM

## 2022-03-21 DIAGNOSIS — R06.2 WHEEZING: ICD-10-CM

## 2022-03-21 DIAGNOSIS — J20.9 ACUTE BRONCHITIS: ICD-10-CM

## 2022-03-21 NOTE — TELEPHONE ENCOUNTER
Called and spoke with patient  He said he has been sick for about a week now  He is coughing up green mucous  His nasal discharge is green as well  He has wheezing, chest tightness and SOB on exertion  Denies fevers and chills  He did an at home covid test and it was negative  Please advise

## 2022-03-21 NOTE — TELEPHONE ENCOUNTER
Patient calling stating he has been coughing and congested and is not feeling well   Please advise [phone]

## 2022-07-28 ENCOUNTER — OFFICE VISIT (OUTPATIENT)
Dept: PULMONOLOGY | Facility: CLINIC | Age: 72
End: 2022-07-28
Payer: MEDICARE

## 2022-07-28 VITALS
RESPIRATION RATE: 18 BRPM | TEMPERATURE: 98.1 F | DIASTOLIC BLOOD PRESSURE: 82 MMHG | WEIGHT: 235 LBS | OXYGEN SATURATION: 96 % | HEART RATE: 82 BPM | HEIGHT: 69 IN | BODY MASS INDEX: 34.8 KG/M2 | SYSTOLIC BLOOD PRESSURE: 132 MMHG

## 2022-07-28 DIAGNOSIS — I82.4Y3 DEEP VEIN THROMBOSIS (DVT) OF PROXIMAL VEIN OF BOTH LOWER EXTREMITIES, UNSPECIFIED CHRONICITY (HCC): ICD-10-CM

## 2022-07-28 DIAGNOSIS — J02.8 ACUTE PHARYNGITIS DUE TO OTHER SPECIFIED ORGANISMS: Primary | ICD-10-CM

## 2022-07-28 PROCEDURE — 99214 OFFICE O/P EST MOD 30 MIN: CPT | Performed by: INTERNAL MEDICINE

## 2022-07-28 RX ORDER — DOXYCYCLINE 100 MG/1
100 TABLET ORAL 2 TIMES DAILY
Qty: 10 TABLET | Refills: 0 | Status: SHIPPED | OUTPATIENT
Start: 2022-07-28 | End: 2022-08-02

## 2022-07-28 RX ORDER — PREDNISONE 10 MG/1
TABLET ORAL
Qty: 30 TABLET | Refills: 0 | Status: SHIPPED | OUTPATIENT
Start: 2022-07-28 | End: 2022-10-12

## 2022-07-28 RX ORDER — GUAIFENESIN AND CODEINE PHOSPHATE 100; 10 MG/5ML; MG/5ML
5 SOLUTION ORAL 3 TIMES DAILY PRN
Qty: 236 ML | Refills: 0 | Status: SHIPPED | OUTPATIENT
Start: 2022-07-28

## 2022-07-28 NOTE — ASSESSMENT & PLAN NOTE
I suspect pt has a viral URI  He has clear b/l breath sounds  No fever    - Cont  Supportive care    - Rx Doxyxycline  - Rx cough medication with codeine to help him sleep at night  - Rx prednisone for bronchitis

## 2022-07-28 NOTE — PROGRESS NOTES
Pulmonary Follow Up Note   Jeannetta Dancer 67 y o  male MRN: [id number]  7/28/2022      Referring provider: Dr Melgoza Mass and Plan:    Acute pharyngitis due to other specified organisms  I suspect pt has a viral URI  He has clear b/l breath sounds  No fever    - Cont  Supportive care  - Rx Doxyxycline  - Rx cough medication with codeine to help him sleep at night  - Rx prednisone for bronchitis      DVT (deep venous thrombosis) ([facility])  Maintained on Apixaban      Diagnoses and all orders for this visit:    Acute pharyngitis due to other specified organisms  -     predniSONE 10 mg tablet; 4 pills PO daily x 3 days; 3 pills PO daily x 3 days; 2 pills PO daily x 3 days; 1 pill PO daily x 3 days  -     doxycycline (ADOXA) 100 MG tablet; Take 1 tablet (100 mg total) by mouth 2 (two) times a day for 5 days  -     guaifenesin-codeine (GUAIFENESIN AC) 100-10 MG/5ML liquid; Take 5 mL by mouth 3 (three) times a day as needed for cough    Deep vein thrombosis (DVT) of proximal vein of both lower extremities, [facility]ity [facility]    Plan of care discussed with patient and his wife  They will f/u with Dr Nina Ontiveros as scheduled  History of Present Illness   HPI:  Jeannetta Dancer is a 67 y o  male who presents for "sick visit"  States he had pain in the right side of his throat a few days ago and then developed a sore throat  Improves with Tylenol  Radiates to his b/l ears when he swallows  Has a productive cough that is green  Cough x 2 days  Hard to sleep due to cough  Uses nasal rinse before bed  Takes Claritin before bed  Uses Breo if needed before bed  Uses Breo as needed which is a few times a month  He is using it since feeling ill  No sick contacts  Denies fevers  Good appetite, good oral intake  Here with his wife who provides additional history   States whenever he does not get antibiotics, he gets sick and lands in the hospital  States he needs stronger antibiotics than Azithromycin  Review of Systems  Positive as above and negative otherwise    Historical Information   Past Medical History:   Diagnosis Date    Allergic rhinitis     Arthritis     COPD, mild (HCC)     GERD (gastroesophageal reflux disease)     History of DVT (deep vein thrombosis)     Hypertension     Kidney stones     Nephrolithiasis     Obesity (BMI 30-39  9)     Osteoarthritis     PE (pulmonary embolism) 12/26/1999    Pneumonia 1/13/2016    Prostate hypertrophy     Rib pain     7,8,9 ribs on Left chest wall with Lsided hernia    Wears glasses     reading     Past Surgical History:   Procedure Laterality Date    HERNIA REPAIR      b/l inguinal hernia with mesh    KIDNEY STONE SURGERY      KNEE ARTHROPLASTY Left     TKR    [place] KNEE SCOPE,SHAVE ARTICULAR CART Right 11/8/2016    Procedure: ARTHROSCOPY KNEE WITH PARTIAL MEDIAL MENISECTOMY;  Surgeon: Alveda Sandifer, MD;  Location: [facility];  Service: Orthopedics     Family History   Problem Relation Age of Onset    Heart disease Mother     Cancer Mother     Cancer Father          Meds/Allergies     Current Outpatient Medications:     amLODIPine (NORVASC) 2 5 mg tablet, 2 5 mg And a 5mg tablet , Disp: , Rfl:     apixaban (ELIQUIS) 5 mg, 10 mg orally twice daily for 7 days and then 5 mg orally twice daily  (Patient taking differently: 2 5 mg 2 (two) times a day), Disp: 28 tablet, Rfl: 0    finasteride (PROSCAR) 5 mg tablet, Take 5 mg by mouth daily  , Disp: , Rfl:     fluticasone-vilanterol (Breo Ellipta) 200-25 MCG/INH inhaler, Inhale 1 puff daily Rinse mouth after use , Disp: 60 each, Rfl: 5    losartan (COZAAR) 100 MG tablet, Take 1 tablet (100 mg total) by mouth daily, Disp: 90 tablet, Rfl: 1    tamsulosin (FLOMAX) 0 4 mg, tamsulosin 0 4 mg capsule, Disp: , Rfl:     chlorthalidone 25 mg tablet, Take 1 tablet (25 mg total) by mouth daily, Disp: 90 tablet, Rfl: 1    meloxicam (MOBIC) 15 mg tablet, Take 1 tablet (15 mg total) by mouth daily, Disp: 30 tablet, Rfl: 0    moxifloxacin (VIGAMOX) 0 5 % ophthalmic solution, Vigamox 0 5 % eye drops, Disp: , Rfl:   Allergies   Allergen Reactions    Other Cough    Pollen Extract        Vitals: Blood pressure 132/82, pulse 82, temperature 98 1 °F (36 7 °C), temperature source Tympanic, resp  rate 18, height 5' 9" (1 753 m), weight 107 kg (235 lb), SpO2 96 %  Body mass index is 34 7 kg/m²  Oxygen Therapy  SpO2: 96 %  Oxygen Therapy: None (Room air)      Physical Exam  GEN: WDWN, nad, comfortable  HEENT: NCAT; EOMI; anicteric sclera b/l  CVS: regular, no m/r/g  LUNGS: clear, no wheezing  ABD:  Soft, nd  EXT: no c/c/e  NEURO: nonfocal  MS: normal strength  SKIN: warm, dry  PSYCH: cooperative    Labs: I have personally reviewed pertinent lab results  Lab Results   Component Value Date    WBC 9 70 03/22/2019    HGB 16 0 03/22/2019    HCT 48 7 03/22/2019    MCV 96 03/22/2019     03/22/2019     Lab Results   Component Value Date    GLUCOSE 101 12/26/2015    CALCIUM 10 1 03/22/2019     12/26/2015    K 4 0 03/22/2019    CO2 27 03/22/2019     03/22/2019    BUN 22 03/22/2019    CREATININE 0 95 03/22/2019     No results found for: IGE  Lab Results   Component Value Date    ALT 36 04/25/2018    AST 23 04/25/2018    ALKPHOS 120 (H) 04/25/2018    BILITOT 0 45 12/26/2015     [facility]

## 2022-08-04 ENCOUNTER — OFFICE VISIT (OUTPATIENT)
Dept: PULMONOLOGY | Facility: CLINIC | Age: 72
End: 2022-08-04
Payer: MEDICARE

## 2022-08-04 VITALS
BODY MASS INDEX: 34.66 KG/M2 | RESPIRATION RATE: 18 BRPM | WEIGHT: 234 LBS | OXYGEN SATURATION: 95 % | DIASTOLIC BLOOD PRESSURE: 68 MMHG | HEIGHT: 69 IN | SYSTOLIC BLOOD PRESSURE: 138 MMHG | HEART RATE: 80 BPM

## 2022-08-04 DIAGNOSIS — E66.09 CLASS 1 OBESITY DUE TO EXCESS CALORIES WITHOUT SERIOUS COMORBIDITY WITH BODY MASS INDEX (BMI) OF 32.0 TO 32.9 IN ADULT: ICD-10-CM

## 2022-08-04 DIAGNOSIS — J45.20 MILD INTERMITTENT ASTHMA WITHOUT COMPLICATION: Primary | ICD-10-CM

## 2022-08-04 DIAGNOSIS — J30.89 NON-SEASONAL ALLERGIC RHINITIS, UNSPECIFIED TRIGGER: ICD-10-CM

## 2022-08-04 PROCEDURE — 99214 OFFICE O/P EST MOD 30 MIN: CPT | Performed by: INTERNAL MEDICINE

## 2022-08-04 RX ORDER — ALBUTEROL SULFATE 90 UG/1
1 POWDER, METERED RESPIRATORY (INHALATION) 4 TIMES DAILY PRN
Qty: 1 EACH | Refills: 5 | Status: SHIPPED | OUTPATIENT
Start: 2022-08-04

## 2022-08-04 NOTE — PROGRESS NOTES
Office Progress Note - Pulmonary    Vera Fraction 67 y o  male MRN: [id number]    Encounter: [id number]      Assessment:   Bronchial asthma   Recent upper respiratory infection  Improved   Allergic rhinitis   Obesity  Plan:    Albuterol rescue inhaler 2 inhalations 4 times a day as needed   Fluticasone nasal spray 2 sprays to each nostril once a day   Saline nasal/sinus rinse     Weight loss   Regular exercise to improve stamina   Follow-up in 6 months  Discussion:   The patient is mostly recent upper respiratory infection has resolved  His asthma for the most part is well controlled  He is requiring only a rescue inhaler  I told him that he should not use the [facility] as a rescue inhaler rather I have ordered albuterol  He will use 2 inhalations 4 times a day as needed  We talked about his weight  He stated that a gained weight because he is not exercising  I have encouraged him to join a gym and start exercising  This will improve his stamina as well as help him lose weight  His allergic rhinitis is fairly treated  I have started him on the fluticasone nasal spray 2 sprays to each nostril once a day  I told him to stop using the oral antihistamine Claritin  He will use the saline nasal/sinus rinse in the evening  I will see him in 6 months in a follow-up visit  Subjective: The patient is here for follow-up visit  Last week he was seen in our office and [place] because of upper respiratory infection  He was diagnosed with pharyngitis  He was treated with doxycycline  His symptoms improved and he is close to his baseline  He has not use the Breo on regular basis  He used it as needed for 2-3 days at a time  He has no nocturnal symptoms  He is fairly sedentary  He has postnasal dripping  He is using Claritin 10 mg once a day and sometimes using the saline nasal/sinus rinse      Review of systems:  A 12 point system review is done and aside from what is stated above the rest of the review of systems is negative  Family history and social history are reviewed  Medications list is reviewed  Vitals: Blood pressure 138/68, pulse 80, resp  rate 18, height 5' 9" (1 753 m), weight 106 kg (234 lb), SpO2 95 %  ,     Physical Exam  Gen: Awake, alert, oriented x 3, no acute distress  HEENT: Mucous membranes moist, no oral lesions, no thrush  NECK: No accessory muscle use, JVP not elevated  Cardiac: Regular, single S1, single S2, no murmurs, no rubs, no gallops  Lungs:  Clear breath sounds  No wheezing or rhonchi  Abdomen: normoactive bowel sounds, soft nontender, nondistended, no rebound or rigidity, no guarding  Extremities: no cyanosis, no clubbing, no edema  Neuro:  Grossly nonfocal   Skin:  No rash    Ref Range & Units 5/16/22  1:13 PM    Hemoglobin 12 5 - 17 g/dL 15 1    Hematocrit 37 - 48 % 44 7    WBC 4 - 10 5 thou/cmm 7 3    RBC 4 - 5 4 mill/cmm 4 88    Platelet Count 092 - 350 thou/cmm 289    MPV 7 5 - 11 3 fL 7 9    MCV 80 - 100 fL 92    MCH 27 - 36 pg 30 8    MCHC 32 - 37 g/dL 33 7    RDW 12 - 16 % 13 8    Differential Type  MANUAL    Absolute Neutrophils 1 8 - 7 8 thou/cmm 4 3    Absolute Lymphocytes 1 - 3 thou/cmm 1 8    Absolute Monocytes 0 3 - 1 thou/cmm 0 7    Absolute Eosinophils 0 - 0 5 thou/cmm 0 4    Absolute Basophils 0 - 0 1 thou/cmm 0 1    Neutrophils % 56    Lymphocytes % 24    Monocytes % 10    Eosinophils % 6    Basophils % 2    Band 0 - 6 % 2      Ref Range & Units 5/16/22  1:13 PM    Glucose 65 - 99 mg/dL 117 High     BUN 7 - 28 mg/dL 14    Creatinine 0 53 - 1 3 mg/dL 0 92    Sodium 135 - 145 mmol/L 136    Potassium 3 5 - 5 2 mmol/L 4 2    Chloride 100 - 109 mmol/L 106    Carbon Dioxide 23 - 31 mmol/L 23    Calcium 8 5 - 10 1 mg/dL 10 0    Alkaline Phosphatase 35 - 120 U/L 110    Albumin 3 5 - 4 8 g/dL 3 5    Bilirubin, Total 0 2 - 1 mg/dL 0 8    Comment: Use of this assay is not recommended for patients undergoing treatment with eltrombopag due to the potential for falsely elevated results     Protein, Total 6 3 - 8 3 g/dL 6 6    AST <41 U/L 16    ALT <56 U/L 25    Anion Gap 3 - 11 7    eGFRcr >59 88

## 2022-08-08 ENCOUNTER — TELEPHONE (OUTPATIENT)
Dept: PULMONOLOGY | Facility: CLINIC | Age: 72
End: 2022-08-08

## 2022-08-08 DIAGNOSIS — J45.20 MILD INTERMITTENT ASTHMA WITHOUT COMPLICATION: Primary | ICD-10-CM

## 2022-08-08 RX ORDER — PREDNISONE 10 MG/1
TABLET ORAL
Qty: 20 TABLET | Refills: 0 | Status: SHIPPED | OUTPATIENT
Start: 2022-08-08 | End: 2022-10-12

## 2022-08-08 RX ORDER — DOXYCYCLINE HYCLATE 150 MG/1
150 TABLET, DELAYED RELEASE ORAL 2 TIMES DAILY
Qty: 10 TABLET | Refills: 0 | Status: SHIPPED | OUTPATIENT
Start: 2022-08-08 | End: 2022-08-13

## 2022-08-08 NOTE — TELEPHONE ENCOUNTER
Que Valerio would like a return call regarding     What is the reason for the call/chief complaint? Upper resp issues/congestion/cough    What additional symptoms are present or absent? SOB, yes  Are they on O2? No   chest pain/tightness, no  wheezing, yes  Cough, yes  mucous production,yes  What color is it white  fevers/chills, no  weight gain, no  Swelling no  Pain no,     When did they start/how long have they been going on? Ongoing for a month or so    What makes it better or worse? Prednisone helped but he has now run out    Have you been exposed to anyone that is sick? No    Have you been tested for COVID recently? no    Check current pulmonary medications Using inhalers as prescribed    Have they had any other treatment or testing for this problem elsewhere? Pt had sick visit with Ki on 7/28 which he was given prednisone and cough syrup, he also just saw Dr Shae Null on 8/4    Recent steroids? Yes, describe: Prednisone taper started 7/28    Recent Antibiotics?  No

## 2022-08-08 NOTE — TELEPHONE ENCOUNTER
Glynn, from [facility] in [facility], called re: James's insurance wont pay for the Doxycyline ER and its over $100 even with a discount  They wanted to know if you could prescribe the regular doxycycline or a different antibiotic    Please advise Mak Embs): [phone]

## 2022-08-08 NOTE — TELEPHONE ENCOUNTER
Spoke with patient likely has reoccurring bronchitis he did state that he felt better on doxycycline and prednisone however after completing antibiotics symptoms reoccurred and he is now having yellow sputum, unable tolerate cough suppressant with codeine    - doxy/prednisone 5 day burst ordered

## 2022-08-08 NOTE — TELEPHONE ENCOUNTER
Patient is calling, he was recently seen on 8/4  He said he is still having upper respiratory problems, coughing and congestion  He wants to been asap   Please advise

## 2022-08-09 RX ORDER — DOXYCYCLINE HYCLATE 100 MG/1
100 CAPSULE ORAL EVERY 12 HOURS SCHEDULED
Qty: 14 CAPSULE | Refills: 0 | Status: SHIPPED | OUTPATIENT
Start: 2022-08-09 | End: 2022-08-16

## 2022-08-24 ENCOUNTER — OFFICE VISIT (OUTPATIENT)
Dept: PULMONOLOGY | Facility: CLINIC | Age: 72
End: 2022-08-24
Payer: MEDICARE

## 2022-08-24 VITALS
BODY MASS INDEX: 35.55 KG/M2 | SYSTOLIC BLOOD PRESSURE: 132 MMHG | HEART RATE: 85 BPM | HEIGHT: 69 IN | DIASTOLIC BLOOD PRESSURE: 68 MMHG | OXYGEN SATURATION: 96 % | TEMPERATURE: 97.9 F | WEIGHT: 240 LBS | RESPIRATION RATE: 18 BRPM

## 2022-08-24 DIAGNOSIS — R60.0 BILATERAL LOWER EXTREMITY EDEMA: ICD-10-CM

## 2022-08-24 DIAGNOSIS — J45.41 MODERATE PERSISTENT ASTHMA WITH (ACUTE) EXACERBATION: Primary | ICD-10-CM

## 2022-08-24 DIAGNOSIS — E66.01 OBESITY, MORBID (HCC): ICD-10-CM

## 2022-08-24 DIAGNOSIS — J06.9 RECURRENT UPPER RESPIRATORY INFECTION (URI): ICD-10-CM

## 2022-08-24 DIAGNOSIS — J45.40 MODERATE PERSISTENT ASTHMA WITHOUT COMPLICATION: ICD-10-CM

## 2022-08-24 PROCEDURE — 99214 OFFICE O/P EST MOD 30 MIN: CPT | Performed by: INTERNAL MEDICINE

## 2022-08-24 RX ORDER — FLUTICASONE PROPIONATE AND SALMETEROL XINAFOATE 230; 21 UG/1; UG/1
2 AEROSOL, METERED RESPIRATORY (INHALATION) 2 TIMES DAILY
Qty: 12 G | Refills: 2 | Status: SHIPPED | OUTPATIENT
Start: 2022-08-24

## 2022-08-24 RX ORDER — TADALAFIL 20 MG/1
20 TABLET ORAL AS NEEDED
COMMUNITY
Start: 2022-08-19

## 2022-08-24 RX ORDER — MONTELUKAST SODIUM 10 MG/1
10 TABLET ORAL
Qty: 30 TABLET | Refills: 2 | Status: SHIPPED | OUTPATIENT
Start: 2022-08-24 | End: 2022-10-12 | Stop reason: SDUPTHER

## 2022-08-24 NOTE — TELEPHONE ENCOUNTER
I called the pt and he informed me that his wife has recently been sick in the past 48 hours  Pt and his wife are both going to get tested   He will call us with an update

## 2022-08-24 NOTE — TELEPHONE ENCOUNTER
Is there anything available in the next day or 2 for a sick visit for this patient he has gone through 2 courses of antibiotics and to prednisone bursts, can we also see if patient has been COVID tested

## 2022-08-24 NOTE — PROGRESS NOTES
Pulmonary Follow Up Note   Lidia Torres 67 y o  male MRN: [id number]  8/24/2022      Assessment:    Recent/recurrent URI  - Doxycycline and prednisone with dr Chitra Gage in July  - Received another course of the same in August  - Reports that he has had an URI or PNA most years and sometimes multiple times a year for as long as he can remember    Bronchial asthma  - Albuterol HFA  - Has breo currently, has not been using regularly  States it is too expensive  - Spirometry with ratio 74%, FEV1 86% without bronchodilator response  Bilateral le swelling  - Unclear etiology  He is on anticoagulation for history of PE  - No echo on file  - Renal and liver function were normal recently    History of PE  - On eliquis   - Had been taken off AC, but d-dimer was elevated   - Factor 5 leiden + reportedly    Nonhodgkin lymphoma   - Dx 2 years ago   - Follows with oncology at [address]:  - Echo and BNP given LE edema, dyspnea, questionable orthopnea  - CXR PA and lateral  - Changing Breo to Advair given insurance coverage  Encouraged scheduled Breo use until he completes his current inhaler then transitioning to Advair  - Albuterol PRN   - Adding Singulair  - Check immunoglobulin levels given history of recurrent infections      Return in about 2 months (around 10/24/2022)  History of Present Illness   HPI:  Lidia Torres is a 67 y o  male who presents for a sick visit  He has a history of persistent asthma which is usually well controlled  He also has a history of recurrent respiratory infections with about 1 a year for many years  He was ill with a sore throat in July and August and was treated with two courses of doxycycline and prednsione  He felt mostly better while on these medications, but again feels symptomatic  His symptoms are slightly better today in that he doesn't have a significant cough, but he feels much more fatigued than usual  He has not had a fever  No sputum production       Review of Systems   Constitutional: Positive for activity change and fatigue  Negative for appetite change, chills, diaphoresis, fever and unexpected weight change  HENT: Negative for ear pain, postnasal drip, rhinorrhea, sinus pressure and sinus pain  Eyes: Negative for pain, redness, itching and visual disturbance  Respiratory: Positive for chest tightness (intermittent) and shortness of breath (with exertion and sometimes when lying flat)  Negative for cough, choking, wheezing and stridor  Cardiovascular: Positive for leg swelling  Negative for chest pain and palpitations  Gastrointestinal: Negative for abdominal pain, constipation, diarrhea, nausea and vomiting  Genitourinary: Negative for difficulty urinating and dysuria  Musculoskeletal: Negative for arthralgias and back pain  Skin: Negative for color change and rash  Neurological: Negative for dizziness, syncope and light-headedness  All other systems reviewed and are negative  Historical Information   Past Medical History:   Diagnosis Date    Allergic rhinitis     Arthritis     COPD, mild (HCC)     GERD (gastroesophageal reflux disease)     History of DVT (deep vein thrombosis)     Hypertension     Kidney stones     Nephrolithiasis     Obesity (BMI 30-39  9)     Osteoarthritis     PE (pulmonary embolism) 12/26/1999    Pneumonia 1/13/2016    Prostate hypertrophy     Rib pain     7,8,9 ribs on Left chest wall with Lsided hernia    Wears glasses     reading     Past Surgical History:   Procedure Laterality Date    HERNIA REPAIR      b/l inguinal hernia with mesh    KIDNEY STONE SURGERY      KNEE ARTHROPLASTY Left     TKR    OH KNEE SCOPE,SHAVE ARTICULAR CART Right 11/8/2016    Procedure: ARTHROSCOPY KNEE WITH PARTIAL MEDIAL MENISECTOMY;  Surgeon: Ana Cristina Carrasquillo MD;  Location: [facility] OR;  Service: Orthopedics     Family History   Problem Relation Age of Onset    Heart disease Mother     Cancer Mother     Cancer Father Social History     Tobacco Use   Smoking Status Former Smoker    Packs/day:  00    Years: 20 00    Pack years: 20 00    Types: Cigarettes    Start date: 64    Quit date:     Years since quittin 6   Smokeless Tobacco Never Used       Meds/Allergies     Current Outpatient Medications:     Albuterol Sulfate (ProAir RespiClick) 708 (90 Base) MCG/ACT AEPB, Inhale 1 puff 4 (four) times a day as needed (Wheezing), Disp: 1 each, Rfl: 5    amLODIPine (NORVASC) 2 5 mg tablet, 2 5 mg And a 5mg tablet , Disp: , Rfl:     apixaban (ELIQUIS) 5 mg, 10 mg orally twice daily for 7 days and then 5 mg orally twice daily  (Patient taking differently: 2 5 mg 2 (two) times a day), Disp: 28 tablet, Rfl: 0    finasteride (PROSCAR) 5 mg tablet, Take 5 mg by mouth daily  , Disp: , Rfl:     fluticasone-salmeterol (Advair HFA) 230-21 MCG/ACT inhaler, Inhale 2 puffs 2 (two) times a day Rinse mouth after use , Disp: 12 g, Rfl: 2    guaifenesin-codeine (GUAIFENESIN AC) 100-10 MG/5ML liquid, Take 5 mL by mouth 3 (three) times a day as needed for cough, Disp: 236 mL, Rfl: 0    losartan (COZAAR) 100 MG tablet, Take 1 tablet (100 mg total) by mouth daily, Disp: 90 tablet, Rfl: 1    montelukast (SINGULAIR) 10 mg tablet, Take 1 tablet (10 mg total) by mouth daily at bedtime, Disp: 30 tablet, Rfl: 2    predniSONE 10 mg tablet, 4 pills PO daily x 3 days; 3 pills PO daily x 3 days; 2 pills PO daily x 3 days; 1 pill PO daily x 3 days, Disp: 30 tablet, Rfl: 0    predniSONE 10 mg tablet, 4 tabs x 5 days, Disp: 20 tablet, Rfl: 0    tadalafil (CIALIS) 20 MG tablet, Take 20 mg by mouth as needed, Disp: , Rfl:     tamsulosin (FLOMAX) 0 4 mg, tamsulosin 0 4 mg capsule, Disp: , Rfl:     meloxicam (MOBIC) 15 mg tablet, Take 1 tablet (15 mg total) by mouth daily, Disp: 30 tablet, Rfl: 0  Allergies   Allergen Reactions    Other Cough    Pollen Extract        Vitals: Blood pressure 132/68, pulse 85, temperature 97 9 °F (36 6 °C), temperature source Tympanic, resp  rate 18, height 5' 9" (1 753 m), weight 109 kg (240 lb), SpO2 96 %  Body mass index is 35 44 kg/m²  Oxygen Therapy  SpO2: 96 %  Oxygen Therapy: None (Room air)      Physical Exam  Physical Exam  Vitals and nursing note reviewed  Constitutional:       General: He is not in acute distress  Appearance: He is well-developed  He is ill-appearing  HENT:      Head: Normocephalic and atraumatic  Mouth/Throat:      Pharynx: No oropharyngeal exudate  Eyes:      General: No scleral icterus  Conjunctiva/sclera: Conjunctivae normal    Cardiovascular:      Rate and Rhythm: Normal rate and regular rhythm  Heart sounds: No murmur heard  No friction rub  No gallop  Pulmonary:      Effort: Pulmonary effort is normal  No respiratory distress  Breath sounds: Normal breath sounds  No stridor  No wheezing, rhonchi or rales  Abdominal:      Palpations: Abdomen is soft  Tenderness: There is no abdominal tenderness  Musculoskeletal:      Right lower leg: Edema present  Left lower leg: Edema present  Skin:     General: Skin is warm and dry  Capillary Refill: Capillary refill takes less than 2 seconds  Neurological:      General: No focal deficit present  Mental Status: He is alert and oriented to person, place, and time  Labs: I have personally reviewed pertinent lab results    Lab Results   Component Value Date    WBC 9 70 03/22/2019    HGB 16 0 03/22/2019    HCT 48 7 03/22/2019    MCV 96 03/22/2019     03/22/2019     Lab Results   Component Value Date    GLUCOSE 101 12/26/2015    CALCIUM 10 1 03/22/2019     12/26/2015    K 4 0 03/22/2019    CO2 27 03/22/2019     03/22/2019    BUN 22 03/22/2019    CREATININE 0 95 03/22/2019     No results found for: IGE  Lab Results   Component Value Date    ALT 36 04/25/2018    AST 23 04/25/2018    ALKPHOS 120 (H) 04/25/2018    BILITOT 0 45 12/26/2015       Preventive   Influenza vaccine: 11/17/21  COVID-19 vaccination: [employer] x 3 last dose 10/11/21  Pneumonia vaccine: PCV13  Lung Cancer screening: Not indicated    Imaging and other studies: I have personally reviewed pertinent films in PACS  XR spine cervical 2 or 3 vw injury    Result Date: 4/4/2019  Impression: No acute osseous abnormality  Degenerative changes as above  Workstation performed: [id number]       EKG, Pathology, and Other Studies: I have personally reviewed pertinent reports  EKG 3/22/19:        Disclaimer: Portions of the record may have been created with voice recognition software  Occasional wrong word or "sound a like" substitutions may have occurred due to the inherent limitations of voice recognition software  Careful consideration should be taken to recognize, using context, where substitutions have occurred      Shahzad Banks DO   Pulmonary & Critical Care Medicine Fellow PGY-V  [facility]

## 2022-08-24 NOTE — TELEPHONE ENCOUNTER
Pt calling saying he is still not feeling well with a respiratory infection and is asking what he doctor wants him to do  He states this has been going on for 4 weeks now  Please advise

## 2022-09-17 ENCOUNTER — HOSPITAL ENCOUNTER (OUTPATIENT)
Dept: NON INVASIVE DIAGNOSTICS | Facility: HOSPITAL | Age: 72
Discharge: HOME | End: 2022-09-17
Payer: MEDICARE

## 2022-09-17 ENCOUNTER — TELEPHONE (OUTPATIENT)
Dept: OTHER | Facility: HOSPITAL | Age: 72
End: 2022-09-17

## 2022-09-17 VITALS
SYSTOLIC BLOOD PRESSURE: 132 MMHG | DIASTOLIC BLOOD PRESSURE: 68 MMHG | WEIGHT: 240 LBS | HEART RATE: 85 BPM | BODY MASS INDEX: 35.55 KG/M2 | HEIGHT: 69 IN

## 2022-09-17 DIAGNOSIS — R60.0 BILATERAL LOWER EXTREMITY EDEMA: ICD-10-CM

## 2022-09-17 LAB
AORTIC ROOT: 3.6 CM
AORTIC VALVE MEAN VELOCITY: 7.4 M/S
APICAL FOUR CHAMBER EJECTION FRACTION: 59 %
ASCENDING AORTA: 3.1 CM
AV MEAN GRADIENT: 3 MMHG
AV PEAK GRADIENT: 4 MMHG
DOP CALC AO PEAK VEL: 1.04 M/S
DOP CALC AO VTI: 22.12 CM
E WAVE DECELERATION TIME: 199 MS
FRACTIONAL SHORTENING: 32 % (ref 28–44)
INTERVENTRICULAR SEPTUM IN DIASTOLE (PARASTERNAL SHORT AXIS VIEW): 1.3 CM
INTERVENTRICULAR SEPTUM: 1.3 CM (ref 0.6–1.1)
IVC: 21 MM
LAAS-AP4: 14 CM2
LEFT ATRIUM SIZE: 5.3 CM
LEFT INTERNAL DIMENSION IN SYSTOLE: 3.9 CM (ref 2.1–4)
LEFT VENTRICULAR INTERNAL DIMENSION IN DIASTOLE: 5.7 CM (ref 3.5–6)
LEFT VENTRICULAR POSTERIOR WALL IN END DIASTOLE: 1.2 CM
LEFT VENTRICULAR STROKE VOLUME: 93 ML
LVSV (TEICH): 93 ML
MV E'TISSUE VEL-SEP: 8 CM/S
MV PEAK A VEL: 0.95 M/S
MV PEAK E VEL: 62 CM/S
MV STENOSIS PRESSURE HALF TIME: 58 MS
MV VALVE AREA P 1/2 METHOD: 3.79 CM2
RA PRESSURE ESTIMATED: 8 MMHG
RIGHT VENTRICLE ID DIMENSION: 3.9 CM
RV PSP: 30 MMHG
SL CV LV EF: 60
SL CV PED ECHO LEFT VENTRICLE DIASTOLIC VOLUME (MOD BIPLANE) 2D: 159 ML
SL CV PED ECHO LEFT VENTRICLE SYSTOLIC VOLUME (MOD BIPLANE) 2D: 66 ML
TR MAX PG: 22 MMHG
TR PEAK VELOCITY: 2.4 M/S
TRICUSPID VALVE PEAK REGURGITATION VELOCITY: 2.36 M/S

## 2022-09-17 PROCEDURE — 93306 TTE W/DOPPLER COMPLETE: CPT | Performed by: STUDENT IN AN ORGANIZED HEALTH CARE EDUCATION/TRAINING PROGRAM

## 2022-09-17 PROCEDURE — 93306 TTE W/DOPPLER COMPLETE: CPT

## 2022-09-17 NOTE — TELEPHONE ENCOUNTER
Called patient  Discussed Echo, CXR ([facility]), BNP and Ig labs [facility]) which were all normal  He states that he is also feeling much better  He no longer has concern for dyspnea  Will plan for follow up in the office as scheduled for next month

## 2022-10-11 PROBLEM — J02.8 ACUTE PHARYNGITIS DUE TO OTHER SPECIFIED ORGANISMS: Status: RESOLVED | Noted: 2022-07-28 | Resolved: 2022-10-11

## 2022-10-12 ENCOUNTER — OFFICE VISIT (OUTPATIENT)
Dept: PULMONOLOGY | Facility: CLINIC | Age: 72
End: 2022-10-12
Payer: MEDICARE

## 2022-10-12 VITALS
WEIGHT: 240 LBS | OXYGEN SATURATION: 95 % | TEMPERATURE: 98.7 F | HEIGHT: 69 IN | DIASTOLIC BLOOD PRESSURE: 72 MMHG | HEART RATE: 85 BPM | SYSTOLIC BLOOD PRESSURE: 124 MMHG | BODY MASS INDEX: 35.55 KG/M2 | RESPIRATION RATE: 18 BRPM

## 2022-10-12 DIAGNOSIS — E66.9 OBESITY (BMI 35.0-39.9 WITHOUT COMORBIDITY): ICD-10-CM

## 2022-10-12 DIAGNOSIS — J06.9 RECURRENT UPPER RESPIRATORY INFECTION (URI): ICD-10-CM

## 2022-10-12 DIAGNOSIS — I82.4Y3 DEEP VEIN THROMBOSIS (DVT) OF PROXIMAL VEIN OF BOTH LOWER EXTREMITIES, UNSPECIFIED CHRONICITY (HCC): ICD-10-CM

## 2022-10-12 DIAGNOSIS — J45.40 MODERATE PERSISTENT ASTHMA WITHOUT COMPLICATION: Primary | ICD-10-CM

## 2022-10-12 DIAGNOSIS — J30.89 NON-SEASONAL ALLERGIC RHINITIS, UNSPECIFIED TRIGGER: ICD-10-CM

## 2022-10-12 PROCEDURE — 99214 OFFICE O/P EST MOD 30 MIN: CPT | Performed by: INTERNAL MEDICINE

## 2022-10-12 RX ORDER — AMLODIPINE BESYLATE 5 MG/1
TABLET ORAL
COMMUNITY
Start: 2022-09-21 | End: 2022-10-12

## 2022-10-12 RX ORDER — MONTELUKAST SODIUM 10 MG/1
10 TABLET ORAL
Qty: 30 TABLET | Refills: 2 | Status: SHIPPED | OUTPATIENT
Start: 2022-10-12

## 2022-10-12 NOTE — PATIENT INSTRUCTIONS
Provide Spacer if available   If not, provide information on where he can  (DME?)    Follow up in 3 months

## 2022-10-12 NOTE — PROGRESS NOTES
Pulmonary Follow Up Note   Topher Parrish 67 y o  male MRN: [id number]  10/12/2022      Assessment:    Recent/recurrent URI  - Doxycycline and prednisone with dr Mi Gannon in July  - Received another course of the same in August  - Reports that he has had an URI or PNA most years and sometimes multiple times a year for as long as he can remember  - Immunoglobulin levels normal  - CXR normal 1/10/2019    Bronchial asthma  - Albuterol HFA  - Has breo currently, has not been using regularly  States it is too expensive  - Spirometry with ratio 74%, FEV1 86% without bronchodilator response  Bilateral le swelling  - Unclear etiology  He is on anticoagulation for history of PE  - Echo demonstrating only G1DD  - Renal and liver function were normal recently    History of PE  - On eliquis   - Had been taken off AC, but d-dimer was elevated   - Factor 5 leiden + reportedly    Nonhodgkin lymphoma   - Dx 2 years ago   - Follows with oncology at [facility]    Grade I diastolic dysfunction  - On echo 5/60/55, uncertain contribution to dypnea    Hoarseness  - Started after changing inhalers to Advair  - No evidence of thrush    Obesity, BMI 35 44  - Discussed need for increased activity and portion control    Plan:  - Continue Advair inhaler, adding spacer in case settled inhaled medication is causing hoarseness due to incomplete inhalation   - Albuterol PRN   - Renewing Singulair  - [place] allergy panel with IgE level  - May follow up with ENT       Return in about 3 months (around 1/12/2023)  History of Present Illness   HPI:  Topher Parrish is a 67 y o  male who presents for a sick visit  He has a history of persistent asthma which is usually well controlled  He also has a history of recurrent respiratory infections with about 1 a year for many years  He was ill with a sore throat in July and August and was treated with two courses of doxycycline and prednsione   He felt mostly better while on these medications, but again feels symptomatic  Today he feels generally well  His dyspnea and wheezing have sigificantly improved  However, he has developed some hoarseness in his voice since transitioning from breo to adviar  His lower extremity pain and swelling has also resolved  ROS otherwise normal today  Review of Systems   Constitutional: Negative for chills and fever  HENT: Positive for congestion and voice change  Negative for ear pain, postnasal drip, rhinorrhea, sinus pressure, sinus pain, sore throat and trouble swallowing  Eyes: Negative for pain and visual disturbance  Respiratory: Negative for cough, choking, chest tightness, shortness of breath, wheezing and stridor  Cardiovascular: Negative for chest pain, palpitations and leg swelling  Gastrointestinal: Negative for abdominal pain, constipation, diarrhea, nausea and vomiting  Genitourinary: Negative for difficulty urinating and dysuria  Musculoskeletal: Negative for arthralgias and back pain  Skin: Negative for color change and rash  Allergic/Immunologic: Positive for environmental allergies  Neurological: Negative for syncope, light-headedness and headaches  All other systems reviewed and are negative  Historical Information   Past Medical History:   Diagnosis Date   • Allergic rhinitis    • Arthritis    • COPD, mild (HCC)    • GERD (gastroesophageal reflux disease)    • History of DVT (deep vein thrombosis)    • Hypertension    • Kidney stones    • Nephrolithiasis    • Obesity (BMI 30-39  9)    • Osteoarthritis    • PE (pulmonary embolism) 12/26/1999   • Pneumonia 1/13/2016   • Prostate hypertrophy    • Rib pain     7,8,9 ribs on Left chest wall with Lsided hernia   • Wears glasses     reading     Past Surgical History:   Procedure Laterality Date   • HERNIA REPAIR      b/l inguinal hernia with mesh   • KIDNEY STONE SURGERY     • KNEE ARTHROPLASTY Left     TKR   • [place] KNEE SCOPE,SHAVE ARTICULAR CART Right 11/8/2016    Procedure: ARTHROSCOPY KNEE WITH PARTIAL MEDIAL MENISECTOMY;  Surgeon: Wilfredo Winkler MD;  Location: [facility];  Service: Orthopedics     Family History   Problem Relation Age of Onset   • Heart disease Mother    • Cancer Mother    • Cancer Father      Social History     Tobacco Use   Smoking Status Former Smoker   • Packs/day: 1 00   • Years: 20    • Pack years: 20    • Types: Cigarettes   • Start date:    • Quit date:    • Years since quittin 7   Smokeless Tobacco Never Used       Meds/Allergies     Current Outpatient Medications:   •  Albuterol Sulfate (ProAir RespiClick) 960 (90 Base) MCG/ACT AEPB, Inhale 1 puff 4 (four) times a day as needed (Wheezing), Disp: 1 each, Rfl: 5  •  amLODIPine (NORVASC) 2 5 mg tablet, 2 5 mg And a 5mg tablet , Disp: , Rfl:   •  apixaban (ELIQUIS) 5 mg, 10 mg orally twice daily for 7 days and then 5 mg orally twice daily  (Patient taking differently: 2 5 mg 2 (two) times a day), Disp: 28 tablet, Rfl: 0  •  finasteride (PROSCAR) 5 mg tablet, Take 5 mg by mouth daily  , Disp: , Rfl:   •  fluticasone-salmeterol (Advair HFA) 230-21 MCG/ACT inhaler, Inhale 2 puffs 2 (two) times a day Rinse mouth after use , Disp: 12 g, Rfl: 2  •  losartan (COZAAR) 100 MG tablet, Take 1 tablet (100 mg total) by mouth daily, Disp: 90 tablet, Rfl: 1  •  montelukast (SINGULAIR) 10 mg tablet, Take 1 tablet (10 mg total) by mouth daily at bedtime, Disp: 30 tablet, Rfl: 2  •  tadalafil (CIALIS) 20 MG tablet, Take 20 mg by mouth as needed, Disp: , Rfl:   •  tamsulosin (FLOMAX) 0 4 mg, tamsulosin 0 4 mg capsule, Disp: , Rfl:   •  guaifenesin-codeine (GUAIFENESIN AC) 100-10 MG/5ML liquid, Take 5 mL by mouth 3 (three) times a day as needed for cough (Patient not taking: Reported on 10/12/2022), Disp: 236 mL, Rfl: 0  Allergies   Allergen Reactions   • Other Cough   • Pollen Extract        Vitals: Blood pressure 124/72, pulse 85, temperature 98 7 °F (37 1 °C), temperature source Probe, resp   rate 18, height 5' 9" (1 753 m), weight 109 kg (240 lb), SpO2 95 %  Body mass index is 35 44 kg/m²  Oxygen Therapy  SpO2: 95 %  Oxygen Therapy: None (Room air)      Physical Exam  Physical Exam  Vitals and nursing note reviewed  Constitutional:       General: He is not in acute distress  Appearance: He is well-developed  He is obese  He is not ill-appearing  HENT:      Head: Normocephalic and atraumatic  Mouth/Throat:      Mouth: Mucous membranes are moist       Pharynx: No oropharyngeal exudate or posterior oropharyngeal erythema  Eyes:      General: No scleral icterus  Conjunctiva/sclera: Conjunctivae normal    Cardiovascular:      Rate and Rhythm: Normal rate and regular rhythm  Heart sounds: No murmur heard  No friction rub  No gallop  Pulmonary:      Effort: Pulmonary effort is normal  No respiratory distress  Breath sounds: Normal breath sounds  No wheezing, rhonchi or rales  Abdominal:      Palpations: Abdomen is soft  Tenderness: There is no abdominal tenderness  There is no guarding  Musculoskeletal:      Cervical back: Neck supple  No tenderness  Right lower leg: No edema  Left lower leg: No edema  Lymphadenopathy:      Cervical: No cervical adenopathy  Skin:     General: Skin is warm and dry  Capillary Refill: Capillary refill takes less than 2 seconds  Neurological:      General: No focal deficit present  Mental Status: He is alert and oriented to person, place, and time  Motor: No weakness  Labs: I have personally reviewed pertinent lab results    Lab Results   Component Value Date    WBC 9 70 03/22/2019    HGB 16 0 03/22/2019    HCT 48 7 03/22/2019    MCV 96 03/22/2019     03/22/2019     Lab Results   Component Value Date    GLUCOSE 101 12/26/2015    CALCIUM 10 1 03/22/2019     12/26/2015    K 4 0 03/22/2019    CO2 27 03/22/2019     03/22/2019    BUN 22 03/22/2019    CREATININE 0 95 03/22/2019     No results found for: IGE  Lab Results Component Value Date    ALT 36 04/25/2018    AST 23 04/25/2018    ALKPHOS 120 (H) 04/25/2018    BILITOT 0 45 12/26/2015       Preventive   Influenza vaccine: 11/17/21  COVID-19 vaccination: [employer] x 3 last dose 10/11/21  Pneumonia vaccine: PCV13  Lung Cancer screening: Not indicated    Imaging and other studies: I have personally reviewed pertinent films in PACS  XR spine cervical 2 or 3 vw injury    Result Date: 4/4/2019  Impression: No acute osseous abnormality  Degenerative changes as above  Workstation performed: [id number]     CXR 9/14/22:  No acute cardiac or pulmonary disease noted  EKG, Pathology, and Other Studies: I have personally reviewed pertinent reports  Echo 9/17/22:  Left Ventricle: Left ventricular cavity size is normal  Wall thickness is mildly increased  The left ventricular ejection fraction is 60%  Systolic function is normal  Wall motion is normal  Diastolic function is mildly abnormal, consistent with grade I (abnormal) relaxation  •  Right Ventricle: Right ventricular cavity size is normal  Systolic function is normal   •  Mitral Valve: There is mild annular calcification  •  Tricuspid Valve: There is mild regurgitation  Disclaimer: Portions of the record may have been created with voice recognition software  Occasional wrong word or "sound a like" substitutions may have occurred due to the inherent limitations of voice recognition software  Careful consideration should be taken to recognize, using context, where substitutions have occurred      Irasema Diaz DO   Pulmonary & Critical Care Medicine Fellow PGY-V  [facility]

## 2022-10-13 LAB
DME PARACHUTE DELIVERY DATE REQUESTED: NORMAL
DME PARACHUTE ITEM DESCRIPTION: NORMAL
DME PARACHUTE ORDER STATUS: NORMAL
DME PARACHUTE SUPPLIER NAME: NORMAL
DME PARACHUTE SUPPLIER PHONE: NORMAL

## 2022-10-17 LAB
DME PARACHUTE DELIVERY DATE ACTUAL: NORMAL
DME PARACHUTE DELIVERY DATE REQUESTED: NORMAL
DME PARACHUTE ITEM DESCRIPTION: NORMAL
DME PARACHUTE ORDER STATUS: NORMAL
DME PARACHUTE SUPPLIER NAME: NORMAL
DME PARACHUTE SUPPLIER PHONE: NORMAL

## 2022-11-10 ENCOUNTER — TELEPHONE (OUTPATIENT)
Dept: PULMONOLOGY | Facility: CLINIC | Age: 72
End: 2022-11-10

## 2022-11-10 NOTE — TELEPHONE ENCOUNTER
LM for patient to give a call back to set up a follow up Appt in [facility] with Dr Gerardo Castro in [place]

## 2022-12-08 ENCOUNTER — TELEPHONE (OUTPATIENT)
Dept: PULMONOLOGY | Facility: CLINIC | Age: 72
End: 2022-12-08

## 2022-12-08 NOTE — TELEPHONE ENCOUNTER
Patient called, he is inquiring about getting prescribed a different inhaler as Advair HFA costs about $800, he is being billed for $40 but it is leading him to get into the donut hole faster with that high cost  He is hoping we can get him prescribed something at a lower cost  I advised him it would be best to contact his insurance company to see what is covered and the best pricing for him and to let us know what options those would be  He understood and will look into this further  He mentioned he just changed [employer] with different coverage and has not received the card yet  He is going to try and get in touch with them to see what the best option is   Thank you

## 2022-12-14 DIAGNOSIS — J45.41 MODERATE PERSISTENT ASTHMA WITH (ACUTE) EXACERBATION: ICD-10-CM

## 2022-12-14 RX ORDER — FLUTICASONE PROPIONATE AND SALMETEROL XINAFOATE 230; 21 UG/1; UG/1
2 AEROSOL, METERED RESPIRATORY (INHALATION) 2 TIMES DAILY
Qty: 12 G | Refills: 3 | Status: SHIPPED | OUTPATIENT
Start: 2022-12-14

## 2023-01-17 NOTE — PROGRESS NOTES
Pulmonary Follow Up Note   Keegan Winter 67 y o  male MRN: [id number]  1/18/2023      Assessment:     Bronchial asthma  - Albuterol HFA  - Previously on Breo, but that became too expensive  Transitioned to Advair with spacer last visit which is also costly  - Spirometry with ratio 74%, FEV1 86% without bronchodilator response  - Maximum peripheral eosinophilia 0 69 Thou/microL    Allergic rhinitis  - Saline rinses PRN  - Flonase daily with improvement     Bilateral LE swelling  - Improved again today  - Unclear etiology  He is on anticoagulation for history of PE  - Echo demonstrating only G1DD  - Renal and liver function normal     History of PE  - On eliquis   - Had been taken off AC, but d-dimer was elevated so this was restarted  - Factor 5 leiden + reportedly     Follicular lymphoma  - Diagnosed 1/2021  - Follows with oncology at [facility]     Grade I diastolic dysfunction  - On echo 5/52/63, uncertain contribution to dypnea    Obesity, BMI 35 44  - Discussed need for increased activity and portion control     - Resolved - Recent/recurrent URI  - Doxycycline and prednisone with dr Екатерина Bradford in July 2022  - Received another course of the same in August 2022  - Reports that he has had an URI or PNA most years and sometimes multiple times a year for as long as he can remember  - Immunoglobulin levels normal  - CXR normal 1/10/2019  - Resolved - no further infections since last visit    Resolved - Hoarseness  - Started after changing inhalers to Advair  - No evidence of thrush     Plan:  - Transitioning Advair to AirDuo with [facility] given cost    - Albuterol PRN   - Continue Singulair  - Continue following with hematology/oncology       Return in about 6 months (around 7/18/2023)  History of Present Illness   HPI:  Keegan Winter is a 67 y o  male who presents in follow up for management of his asthma  He has been very well controlled since his last visit  He has not had any currence of his repeated URIs   He denies dyspnea, chest ightness, cough, nightly awakenings or sputum production  His primary concern today was the cost of his medications  Paying for both his Eliquis and inhaler has put him in the medicare donut hole and he has been looking into alternative options  A majority of today's visit was spend in counseling and discussing alternative inhaler therapies and alternative pharmacies including [facility] and [facility]  Review of Systems   Constitutional: Negative for activity change, appetite change, chills, fatigue, fever and unexpected weight change  HENT: Negative for ear pain, rhinorrhea, sinus pressure and sore throat  Eyes: Negative for pain and visual disturbance  Respiratory: Negative for cough, choking, chest tightness, shortness of breath, wheezing and stridor  Cardiovascular: Negative for chest pain, palpitations and leg swelling  Gastrointestinal: Negative for abdominal pain, constipation, diarrhea, nausea and vomiting  Genitourinary: Negative for difficulty urinating, dysuria and hematuria  Musculoskeletal: Positive for arthralgias  Negative for back pain  Skin: Negative for color change and rash  Allergic/Immunologic: Negative for environmental allergies  Neurological: Negative for syncope and light-headedness  All other systems reviewed and are negative  Historical Information   Past Medical History:   Diagnosis Date   • Allergic rhinitis    • Arthritis    • COPD, mild (HCC)    • GERD (gastroesophageal reflux disease)    • History of DVT (deep vein thrombosis)    • Hypertension    • Kidney stones    • Nephrolithiasis    • Obesity (BMI 30-39  9)    • Osteoarthritis    • PE (pulmonary embolism) 12/26/1999   • Pneumonia 1/13/2016   • Prostate hypertrophy    • Rib pain     7,8,9 ribs on Left chest wall with Lsided hernia   • Wears glasses     reading     Past Surgical History:   Procedure Laterality Date   • HERNIA REPAIR      b/l inguinal hernia with mesh   • KIDNEY STONE SURGERY     • KNEE ARTHROPLASTY Left     TKR   • [place] ARTHRS KNEE DEBRIDEMENT/SHAVING ARTCLR CRTLG Right 2016    Procedure: ARTHROSCOPY KNEE WITH PARTIAL MEDIAL MENISECTOMY;  Surgeon: Agapito Fuentes MD;  Location: [facility];  Service: Orthopedics     Family History   Problem Relation Age of Onset   • Heart disease Mother    • Cancer Mother    • Cancer Father      Social History     Tobacco Use   Smoking Status Former   • Packs/day: 1 00   • Years: 20 00   • Pack years: 20 00   • Types: Cigarettes   • Start date:    • Quit date:    • Years since quittin 0   Smokeless Tobacco Never       Meds/Allergies     Current Outpatient Medications:   •  amLODIPine (NORVASC) 2 5 mg tablet, 2 5 mg And a 5mg tablet , Disp: , Rfl:   •  apixaban (ELIQUIS) 5 mg, 10 mg orally twice daily for 7 days and then 5 mg orally twice daily  (Patient taking differently: 2 5 mg 2 (two) times a day), Disp: 28 tablet, Rfl: 0  •  doxycycline (ADOXA) 100 MG tablet, doxycycline monohydrate 100 mg tablet, Disp: , Rfl:   •  finasteride (PROSCAR) 5 mg tablet, Take 5 mg by mouth daily  , Disp: , Rfl:   •  fluticasone-salmeterol (AirDuo RespiClick 196/57) 817-98 mcg/act dry powder inhaler, Inhale 1 puff 2 (two) times a day Rinse mouth after use , Disp: 1 each, Rfl: 5  •  guaifenesin-codeine (GUAIFENESIN AC) 100-10 MG/5ML liquid, Take 5 mL by mouth 3 (three) times a day as needed for cough, Disp: 236 mL, Rfl: 0  •  losartan (COZAAR) 100 MG tablet, Take 1 tablet (100 mg total) by mouth daily, Disp: 90 tablet, Rfl: 1  •  meloxicam (MOBIC) 15 mg tablet, , Disp: , Rfl:   •  montelukast (SINGULAIR) 10 mg tablet, Take 1 tablet (10 mg total) by mouth daily at bedtime, Disp: 30 tablet, Rfl: 5  •  tadalafil (CIALIS) 20 MG tablet, Take 20 mg by mouth as needed, Disp: , Rfl:   •  tamsulosin (FLOMAX) 0 4 mg, tamsulosin 0 4 mg capsule, Disp: , Rfl:   •  Albuterol Sulfate (ProAir RespiClick) 202 (90 Base) MCG/ACT AEPB, Inhale 1 puff 4 (four) times a day as needed (Wheezing) (Patient not taking: Reported on 1/18/2023), Disp: 1 each, Rfl: 5  Allergies   Allergen Reactions   • Other Cough   • Pollen Extract Cough       Vitals: Blood pressure 136/84, pulse 95, temperature 97 9 °F (36 6 °C), temperature source Tympanic, resp  rate 18, height 5' 9" (1 753 m), weight 109 kg (240 lb), SpO2 96 %  Body mass index is 35 44 kg/m²  Oxygen Therapy  SpO2: 96 %  Oxygen Therapy: None (Room air)      Physical Exam  Physical Exam  Vitals and nursing note reviewed  Constitutional:       General: He is not in acute distress  Appearance: Normal appearance  He is well-developed  He is not ill-appearing  HENT:      Head: Normocephalic and atraumatic  Mouth/Throat:      Mouth: Mucous membranes are moist       Pharynx: Oropharynx is clear  No oropharyngeal exudate or posterior oropharyngeal erythema  Eyes:      General: No scleral icterus  Conjunctiva/sclera: Conjunctivae normal    Cardiovascular:      Rate and Rhythm: Normal rate and regular rhythm  Heart sounds: No murmur heard  No friction rub  No gallop  Pulmonary:      Effort: Pulmonary effort is normal  No respiratory distress  Breath sounds: Normal breath sounds  No wheezing, rhonchi or rales  Abdominal:      Palpations: Abdomen is soft  Tenderness: There is no abdominal tenderness  There is no guarding  Musculoskeletal:         General: No swelling  Right lower leg: No edema  Left lower leg: No edema  Skin:     General: Skin is warm and dry  Capillary Refill: Capillary refill takes less than 2 seconds  Neurological:      General: No focal deficit present  Mental Status: He is alert and oriented to person, place, and time  Motor: No weakness  Psychiatric:         Mood and Affect: Mood normal          Labs: I have personally reviewed pertinent lab results    Lab Results   Component Value Date    WBC 9 70 03/22/2019    HGB 16 0 03/22/2019    HCT 48 7 03/22/2019    MCV 96 03/22/2019     03/22/2019     Lab Results   Component Value Date    GLUCOSE 101 12/26/2015    CALCIUM 10 1 03/22/2019     12/26/2015    K 4 0 03/22/2019    CO2 27 03/22/2019     03/22/2019    BUN 22 03/22/2019    CREATININE 0 95 03/22/2019     No results found for: IGE  Lab Results   Component Value Date    ALT 36 04/25/2018    AST 23 04/25/2018    ALKPHOS 120 (H) 04/25/2018    BILITOT 0 45 12/26/2015       Preventive: Influenza vaccine: 11/17/21  COVID-19 vaccination: Mims Peter x 3 last dose 10/11/21  Pneumonia vaccine: PCV13  Lung Cancer screening: Not indicated    Imaging and other studies: I have personally reviewed pertinent films in PACS  XR spine cervical 2 or 3 vw injury     Result Date: 4/4/2019  Impression: No acute osseous abnormality  Degenerative changes as above  Workstation performed: [id number]      CXR 9/14/22:  No acute cardiac or pulmonary disease noted  Pulmonary function testing:   Reported: Spirometry with ratio 74%, FEV1 86% without bronchodilator response  EKG, Pathology, and Other Studies: I have personally reviewed pertinent reports  Echo 9/17/22:  Left Ventricle: Left ventricular cavity size is normal  Wall thickness is mildly increased  The left ventricular ejection fraction is 60%  Systolic function is normal  Wall motion is normal  Diastolic function is mildly abnormal, consistent with grade I (abnormal) relaxation  •  Right Ventricle: Right ventricular cavity size is normal  Systolic function is normal   •  Mitral Valve: There is mild annular calcification  •  Tricuspid Valve: There is mild regurgitation  Disclaimer: Portions of the record may have been created with voice recognition software  Occasional wrong word or "sound a like" substitutions may have occurred due to the inherent limitations of voice recognition software   Careful consideration should be taken to recognize, using context, where substitutions have occurred      Zulay Eagle DO   Pulmonary & Critical Care Medicine Fellow PGY-V  [facility]

## 2023-01-18 ENCOUNTER — OFFICE VISIT (OUTPATIENT)
Dept: PULMONOLOGY | Facility: CLINIC | Age: 73
End: 2023-01-18

## 2023-01-18 VITALS
BODY MASS INDEX: 35.55 KG/M2 | DIASTOLIC BLOOD PRESSURE: 84 MMHG | WEIGHT: 240 LBS | SYSTOLIC BLOOD PRESSURE: 136 MMHG | HEIGHT: 69 IN | RESPIRATION RATE: 18 BRPM | HEART RATE: 95 BPM | OXYGEN SATURATION: 96 % | TEMPERATURE: 97.9 F

## 2023-01-18 DIAGNOSIS — J45.40 MODERATE PERSISTENT ASTHMA WITHOUT COMPLICATION: Primary | ICD-10-CM

## 2023-01-18 DIAGNOSIS — E66.01 OBESITY, MORBID (HCC): ICD-10-CM

## 2023-01-18 DIAGNOSIS — E66.9 OBESITY (BMI 35.0-39.9 WITHOUT COMORBIDITY): ICD-10-CM

## 2023-01-18 RX ORDER — DOXYCYCLINE 100 MG/1
TABLET ORAL
COMMUNITY
End: 2023-01-19 | Stop reason: ALTCHOICE

## 2023-01-18 RX ORDER — FLUTICASONE PROPIONATE AND SALMETEROL 232; 14 UG/1; UG/1
1 POWDER, METERED RESPIRATORY (INHALATION) 2 TIMES DAILY
Qty: 1 EACH | Refills: 5 | Status: SHIPPED | OUTPATIENT
Start: 2023-01-18 | End: 2023-02-17

## 2023-01-18 RX ORDER — MONTELUKAST SODIUM 10 MG/1
10 TABLET ORAL
Qty: 30 TABLET | Refills: 5 | Status: SHIPPED | OUTPATIENT
Start: 2023-01-18 | End: 2023-02-17

## 2023-01-18 RX ORDER — MELOXICAM 15 MG/1
TABLET ORAL
COMMUNITY
Start: 2022-12-01

## 2023-01-18 RX ORDER — FLUTICASONE PROPIONATE AND SALMETEROL 250; 50 UG/1; UG/1
1 POWDER RESPIRATORY (INHALATION) 2 TIMES DAILY
Qty: 180 BLISTER | Refills: 2 | Status: CANCELLED | OUTPATIENT
Start: 2023-01-18 | End: 2023-04-18

## 2023-01-19 PROBLEM — E66.01 OBESITY, MORBID (HCC): Status: RESOLVED | Noted: 2022-08-24 | Resolved: 2023-01-19

## 2023-01-19 PROBLEM — E27.8 LEFT ADRENAL MASS (HCC): Status: ACTIVE | Noted: 2022-10-13

## 2023-01-19 PROBLEM — J45.40 MODERATE PERSISTENT ASTHMA WITHOUT COMPLICATION: Status: ACTIVE | Noted: 2023-01-19

## 2023-03-16 ENCOUNTER — TELEPHONE (OUTPATIENT)
Dept: PULMONOLOGY | Facility: CLINIC | Age: 73
End: 2023-03-16

## 2023-03-16 NOTE — TELEPHONE ENCOUNTER
Called patient to discuss his current acute illness  He is currently having mild symptoms of COVID19 disease  Last night he had more mucus production than usual and inquired what else he could do to clear this mucus and avoid progression towards pneumonia  I supported the initiation of Paxlovid  Re-assured that this will not interfere with his controller inhalers  Encouraged rescue inhaler use as necessary  Also suggested guaifenesin for mucus clearance  He sounds stable for continued care at home under this regimen  He was encouraged to call back if he develops higher grade, persistent fevers or if his dyspnea or mucus worsens  If his condition worsens and he is unable to reach the office in a timely manor he may report directly to the emergency department

## 2023-03-16 NOTE — TELEPHONE ENCOUNTER
Pt has called in stating he has previously tested positive for covid  Pt was advised to contact his primary doctor in which he has stated he has done  Pt states his primary doctor prescribed him paxlovid  Pt states he has been experiencing coughing and wheezing and would like his pulmonologist to advise him on any other medication and or advice on what to do moving forward   Please advise

## 2023-07-05 DIAGNOSIS — J45.40 MODERATE PERSISTENT ASTHMA WITHOUT COMPLICATION: ICD-10-CM

## 2023-07-05 RX ORDER — MONTELUKAST SODIUM 10 MG/1
10 TABLET ORAL
Qty: 30 TABLET | Refills: 5 | Status: SHIPPED | OUTPATIENT
Start: 2023-07-05 | End: 2023-08-04

## 2023-07-21 DIAGNOSIS — J45.40 MODERATE PERSISTENT ASTHMA WITHOUT COMPLICATION: ICD-10-CM

## 2023-07-21 RX ORDER — FLUTICASONE PROPIONATE AND SALMETEROL 232; 14 UG/1; UG/1
1 POWDER, METERED RESPIRATORY (INHALATION) 2 TIMES DAILY
Qty: 3 EACH | Refills: 0 | Status: SHIPPED | OUTPATIENT
Start: 2023-07-21 | End: 2023-08-20

## 2023-07-21 NOTE — TELEPHONE ENCOUNTER
Patient Kerline Haider is requesting a refill for     Medication(s) Name- Air Duo inhaler    Dose- 232/-14 mcg    Last office Visit- 1/18/2023    Zac Gonzalez (30 day or 90 day)- 90 day

## 2023-10-17 DIAGNOSIS — J45.40 MODERATE PERSISTENT ASTHMA WITHOUT COMPLICATION: ICD-10-CM

## 2023-10-17 RX ORDER — FLUTICASONE PROPIONATE AND SALMETEROL 232; 14 UG/1; UG/1
1 POWDER, METERED RESPIRATORY (INHALATION) 2 TIMES DAILY
Qty: 3 EACH | Refills: 0 | Status: SHIPPED | OUTPATIENT
Start: 2023-10-17 | End: 2023-11-16

## 2023-11-10 ENCOUNTER — APPOINTMENT (OUTPATIENT)
Dept: RADIOLOGY | Facility: MEDICAL CENTER | Age: 73
End: 2023-11-10
Payer: MEDICARE

## 2023-11-10 DIAGNOSIS — J45.41 MODERATE PERSISTENT ASTHMA WITH (ACUTE) EXACERBATION: ICD-10-CM

## 2023-11-10 PROCEDURE — 71046 X-RAY EXAM CHEST 2 VIEWS: CPT

## 2023-11-15 ENCOUNTER — OFFICE VISIT (OUTPATIENT)
Dept: PULMONOLOGY | Facility: CLINIC | Age: 73
End: 2023-11-15
Payer: MEDICARE

## 2023-11-15 VITALS
HEIGHT: 69 IN | SYSTOLIC BLOOD PRESSURE: 128 MMHG | OXYGEN SATURATION: 94 % | WEIGHT: 217.4 LBS | HEART RATE: 88 BPM | DIASTOLIC BLOOD PRESSURE: 64 MMHG | BODY MASS INDEX: 32.2 KG/M2

## 2023-11-15 DIAGNOSIS — D68.51 FACTOR V LEIDEN MUTATION (HCC): Chronic | ICD-10-CM

## 2023-11-15 DIAGNOSIS — J30.89 SEASONAL ALLERGIC RHINITIS DUE TO OTHER ALLERGIC TRIGGER: ICD-10-CM

## 2023-11-15 DIAGNOSIS — E66.09 CLASS 1 OBESITY DUE TO EXCESS CALORIES WITHOUT SERIOUS COMORBIDITY WITH BODY MASS INDEX (BMI) OF 32.0 TO 32.9 IN ADULT: ICD-10-CM

## 2023-11-15 DIAGNOSIS — J45.40 MODERATE PERSISTENT ASTHMA WITHOUT COMPLICATION: Primary | ICD-10-CM

## 2023-11-15 PROCEDURE — 99214 OFFICE O/P EST MOD 30 MIN: CPT | Performed by: STUDENT IN AN ORGANIZED HEALTH CARE EDUCATION/TRAINING PROGRAM

## 2023-11-15 RX ORDER — TRAZODONE HYDROCHLORIDE 50 MG/1
TABLET ORAL
COMMUNITY

## 2023-11-15 RX ORDER — LIDOCAINE HYDROCHLORIDE 10 MG/ML
10 INJECTION, SOLUTION INFILTRATION; PERINEURAL
COMMUNITY

## 2023-11-15 RX ORDER — NIRMATRELVIR AND RITONAVIR 300-100 MG
KIT ORAL
COMMUNITY

## 2023-11-15 RX ORDER — MONTELUKAST SODIUM 10 MG/1
10 TABLET ORAL
Qty: 30 TABLET | Refills: 5 | Status: SHIPPED | OUTPATIENT
Start: 2023-11-15 | End: 2024-05-13

## 2023-11-15 RX ORDER — FLUTICASONE PROPIONATE 50 MCG
2 SPRAY, SUSPENSION (ML) NASAL DAILY
Qty: 16 G | Refills: 6 | Status: SHIPPED | OUTPATIENT
Start: 2023-11-15

## 2023-11-15 RX ORDER — FLUTICASONE PROPIONATE 50 MCG
2 SPRAY, SUSPENSION (ML) NASAL DAILY
COMMUNITY
End: 2023-11-15 | Stop reason: SDUPTHER

## 2023-11-15 RX ORDER — ROSUVASTATIN CALCIUM 5 MG/1
TABLET, COATED ORAL
COMMUNITY

## 2023-11-15 NOTE — PROGRESS NOTES
Pulmonary Follow Up Note   Karen Magana 75 y.o. male MRN: [id number]  11/15/2023      Assessment:    Bronchial asthma  - Has not needed rescue albuterol since starting an ICS/LABA  - Previously on Breo, but that became too expensive. Transitioned to Advair with spacer which was also costly. Started on Airduo last visit. - Spirometry with ratio 74%, FEV1 86% without bronchodilator response. - Maximum peripheral eosinophilia 0.69 Thou/microL     Allergic rhinitis  - Saline rinses PRN  - Hasn't been using flonase, but has developed some rhinorrhea in the last few weeks and is open to re-initiating this medication     History of PE  - On eliquis   - Had been taken off AC, but d-dimer was elevated so this was restarted  - Factor 5 leiden + reportedly     Stage II Follicular lymphoma  - Diagnosed 1/2021  - Follows with oncology at [facility]     Grade I diastolic dysfunction  - On echo 9/96/01, uncertain contribution to dypnea     Obesity, BMI 35.44  - Discussed need for increased activity and portion control        Plan:  - AirDuo 232/14 BID  - Albuterol PRN   - Refilling Singulair  - Restart flonase  - Continue following with hematology/oncology       Return in about 6 months (around 5/15/2024). History of Present Illness   HPI:  Karen Magana is a 75 y.o. male who presents in routine follow up for his respiratory disease. He did get COVID as did his wife in March of this year. They received paxlovid and recovered back to baseline without issue. Since then he has felt well with no lingering effects. He has not had any exacerbations of his breathing. No health care visits or hospitalizations recently. He has not needed his albuterol rescue inhaler at all recently. He states that since starting the ICS/LABA that he has not used the albuterol. He does rinse his mouth after each dose and has no oral rash or pain. He does note some rhinorrhea in the last few weeks.  He has started doing nasal saline rinses again, but has not used his flonase. Review of Systems    Historical Information   Past Medical History:   Diagnosis Date    Allergic rhinitis     Arthritis     COPD, mild (HCC)     GERD (gastroesophageal reflux disease)     History of DVT (deep vein thrombosis)     Hypertension     Kidney stones     Nephrolithiasis     Obesity (BMI 30-39. 9)     Osteoarthritis     PE (pulmonary embolism) 1999    Pneumonia 2016    Prostate hypertrophy     Rib pain     7,8,9 ribs on Left chest wall with Lsided hernia    Wears glasses     reading     Past Surgical History:   Procedure Laterality Date    CT NEEDLE BIOPSY RETROPERITONEUM  2021    HERNIA REPAIR      b/l inguinal hernia with mesh    KIDNEY STONE SURGERY      KNEE ARTHROPLASTY Left     TKR    [place] ARTHRS KNEE DEBRIDEMENT/SHAVING ARTCLR CRTLG Right 2016    Procedure: ARTHROSCOPY KNEE WITH PARTIAL MEDIAL MENISECTOMY;  Surgeon: Patty Koo MD;  Location: [facility];  Service: Orthopedics     Family History   Problem Relation Age of Onset    Heart disease Mother     Cancer Mother     Cancer Father      Social History     Tobacco Use   Smoking Status Former    Packs/day: 1.00    Years: 20.00    Total pack years: 20.00    Types: Cigarettes    Start date:     Quit date:     Years since quittin.8   Smokeless Tobacco Never       Meds/Allergies     Current Outpatient Medications:     Albuterol Sulfate (ProAir RespiClick) 431 (90 Base) MCG/ACT AEPB, Inhale 1 puff 4 (four) times a day as needed (Wheezing), Disp: 1 each, Rfl: 5    amLODIPine (NORVASC) 2.5 mg tablet, 2.5 mg And a 5mg tablet , Disp: , Rfl:     apixaban (ELIQUIS) 5 mg, 10 mg orally twice daily for 7 days and then 5 mg orally twice daily. (Patient taking differently: 2.5 mg 2 (two) times a day), Disp: 28 tablet, Rfl: 0    finasteride (PROSCAR) 5 mg tablet, Take 5 mg by mouth daily. , Disp: , Rfl:     fluticasone (FLONASE) 50 mcg/act nasal spray, 2 sprays into each nostril daily, Disp: 16 g, Rfl: 6    fluticasone-salmeterol (AirDuo RespiClick 492/01) 358-04 mcg/act dry powder inhaler, Inhale 1 puff 2 (two) times a day Rinse mouth after use., Disp: 3 each, Rfl: 0    losartan (COZAAR) 100 MG tablet, Take 1 tablet (100 mg total) by mouth daily, Disp: 90 tablet, Rfl: 1    meloxicam (MOBIC) 15 mg tablet, , Disp: , Rfl:     montelukast (SINGULAIR) 10 mg tablet, Take 1 tablet (10 mg total) by mouth daily at bedtime, Disp: 30 tablet, Rfl: 5    tadalafil (CIALIS) 20 MG tablet, Take 20 mg by mouth as needed, Disp: , Rfl:     tamsulosin (FLOMAX) 0.4 mg, tamsulosin 0.4 mg capsule, Disp: , Rfl:     traZODone (DESYREL) 50 mg tablet, , Disp: , Rfl:     lidocaine (XYLOCAINE) 1 %, Inject 10 mL as directed (Patient not taking: Reported on 11/15/2023), Disp: , Rfl:     nirmatrelvir & ritonavir (Paxlovid, 300/100,) tablet therapy pack, Paxlovid 300 mg (150 mg x 2)-100 mg tablets in a dose pack (Patient not taking: Reported on 11/15/2023), Disp: , Rfl:     rosuvastatin (CRESTOR) 5 mg tablet, , Disp: , Rfl:   Allergies   Allergen Reactions    Other Cough    Pollen Extract Cough       Vitals: Blood pressure 128/64, pulse 88, height 5' 9" (1.753 m), weight 98.6 kg (217 lb 6.4 oz), SpO2 94 %. Body mass index is 32.1 kg/m². Oxygen Therapy  SpO2: 94 %  Oxygen Therapy: None (Room air)      Physical Exam  Physical Exam    Labs: I have personally reviewed pertinent lab results.   Lab Results   Component Value Date    WBC 9.70 03/22/2019    HGB 16.0 03/22/2019    HCT 48.7 03/22/2019    MCV 96 03/22/2019     03/22/2019     Lab Results   Component Value Date    GLUCOSE 101 12/26/2015    CALCIUM 10.1 03/22/2019     12/26/2015    K 4.0 03/22/2019    CO2 27 03/22/2019     03/22/2019    BUN 22 03/22/2019    CREATININE 0.95 03/22/2019     No results found for: "IGE"  Lab Results   Component Value Date    ALT 36 04/25/2018    AST 23 04/25/2018    ALKPHOS 120 (H) 04/25/2018    BILITOT 0.45 12/26/2015         Preventive Influenza vaccine: Obtained this year - 11/3/23  COVID-19 vaccination: Dustin x 4 with last dose 11/3/23  Pneumonia vaccine: PCV13 8/9/21, PPSV20 10/13/22  RSV: Plans to obtain  Lung Cancer screening: N/A      Imaging and other studies: I have personally reviewed pertinent films in PACS  CT chest/abd/pelvis 11/29/22:  1. Stable examination of the chest, abdomen and pelvis with no pathologically   enlarged lymph node. 2. Pulmonary nodule in left lower lobe measuring 7 mm is stable. 3. Cholelithiasis. 4. Bilateral nephrolithiasis. 5. Colonic diverticulosis. Additional findings as detailed above. Pulmonary function testing: Prior in-office spirometry has been reported as normal    EKG, Pathology, and Other Studies: I have personally reviewed pertinent reports. TTE 9/17/22:    Left Ventricle: Left ventricular cavity size is normal. Wall thickness is mildly increased. The left ventricular ejection fraction is 60%. Systolic function is normal. Wall motion is normal. Diastolic function is mildly abnormal, consistent with grade I (abnormal) relaxation. Right Ventricle: Right ventricular cavity size is normal. Systolic function is normal.    Mitral Valve: There is mild annular calcification. Tricuspid Valve: There is mild regurgitation. Disclaimer: Portions of the record may have been created with voice recognition software. Occasional wrong word or "sound a like" substitutions may have occurred due to the inherent limitations of voice recognition software. Careful consideration should be taken to recognize, using context, where substitutions have occurred.     Rina Ybarra DO   Pulmonary & Critical Care Medicine Fellow PGY-V  [facility]

## 2023-11-24 ENCOUNTER — TELEPHONE (OUTPATIENT)
Dept: PULMONOLOGY | Facility: CLINIC | Age: 73
End: 2023-11-24

## 2023-11-24 NOTE — TELEPHONE ENCOUNTER
Spoke with patient regarding his CXR. The effusion previously seen on CT chest is not visible on CXR, though it was small enough on CT that it may not be visible on CXR even if it had persisted. Patient feels generally well and shared decision was made to avoid further testing at this point.

## 2023-12-08 ENCOUNTER — TELEPHONE (OUTPATIENT)
Dept: PULMONOLOGY | Facility: CLINIC | Age: 73
End: 2023-12-08

## 2023-12-08 DIAGNOSIS — J45.41 MODERATE PERSISTENT ASTHMA WITH EXACERBATION: Primary | ICD-10-CM

## 2023-12-08 RX ORDER — PREDNISONE 20 MG/1
40 TABLET ORAL DAILY
Qty: 10 TABLET | Refills: 0 | Status: SHIPPED | OUTPATIENT
Start: 2023-12-08 | End: 2023-12-13

## 2023-12-08 NOTE — TELEPHONE ENCOUNTER
Returned patient's phone call. He was in his usual state of health until Wednesday this week (2 days ago). He notes that on Saturday he was at a concert. Wednesday he started to develop chest tightness and wheezing without fever. This progressed to include a cough that is intermittently productive of white or yellow sputum. He does not feel systemically ill and does not have a fever. Seems that this is likely an exacerbation of his respiratory disease, possibly secondary to a viral illness. Will prescribe a short course of prednisone. He was counseled to call back if he develops a fever or report to the emergency department if he feels much worse.

## 2023-12-08 NOTE — TELEPHONE ENCOUNTER
Lakhwinderesau Espitiatamara would like a return call regarding     What is the reason for the call/chief complaint? Upper Respiratory     What additional symptoms are present or absent? SOB, yes   Are they on O2? No   chest pain/tightness, no  wheezing, yes  Cough, yes  mucous production,yes. What color is it thick yellow  fevers/chills, no  weight gain, no  Swelling no  Pain no, does it hurt when breathing or all the time? Rough to breath     When did they start/how long have they been going on? Wednesday 12/6    Constant or intermittent; if intermittent describe no    What makes it better or worse? not much is helping with the severe coughing    Have you been exposed to anyone that is sick? No    Have you been tested for COVID recently? Yes negative     Check current pulmonary medications Air duo, flonase, singulair, albuterol  frequency of use daily    Have they had any other treatment or testing for this problem elsewhere? no    Recent steroids? No    Recent Antibiotics? No     Last office visit? 11/15    Patient pharmacy? [facility]   30 or 90 day supply 30 day     Patient states the coughing gets severe at night time .

## 2023-12-12 DIAGNOSIS — J01.90 ACUTE NON-RECURRENT SINUSITIS, UNSPECIFIED LOCATION: Primary | ICD-10-CM

## 2023-12-12 RX ORDER — AMOXICILLIN AND CLAVULANATE POTASSIUM 562.5; 437.5; 62.5 MG/1; MG/1; MG/1
2 TABLET, MULTILAYER, EXTENDED RELEASE ORAL 2 TIMES DAILY
Qty: 56 TABLET | Refills: 0 | Status: SHIPPED | OUTPATIENT
Start: 2023-12-12 | End: 2023-12-13 | Stop reason: ALTCHOICE

## 2023-12-12 NOTE — TELEPHONE ENCOUNTER
Karol Cobian would like a return call regarding     What is the reason for the call/chief complaint? COUGH/ CONGESTION    What additional symptoms are present or absent? SOB, no   Are they on O2? No Pulse O2 restingN/A When walkingN/A   chest pain/tightness, yes  wheezing, yes  Cough, yes  mucous production,yes. What color is it YELLOW  fevers/chills, no  weight gain, no  Swelling no  Pain no, does it hurt when breathing or all the time? N/A    When did they start/how long have they been going on? 1 WEEK    Constant or intermittent; if intermittent describe yes? What makes it better or worse? N/A    Have you been exposed to anyone that is sick? No    Have you been tested for COVID recently? Yes, COVID NEGATIVE    Check current pulmonary medications INHALER  frequency of use DAILY    Have they had any other treatment or testing for this problem elsewhere? N/A    Recent steroids? YES, PT FINISHED PREDNISONE TAPER TODAY    Recent Antibiotics? No     Last office visit? Patient pharmacy?  [id number] - [place]  [address]  Phone: [phone]  Fax: [phone]    30 or 90 day supply

## 2023-12-12 NOTE — TELEPHONE ENCOUNTER
PT LVM stating he has been taking the prednisone but is seeing no improvement. Would like a call back to discuss.

## 2023-12-12 NOTE — PROGRESS NOTES
Returned patient's phone call. He did not feel significantly better after a course of prednisone. He has been having sinus congestion that has been worsening since last Saturday. Producing thickened yellow mucus when blowing his nose and not sleeping well secondary to this. With this timeline, bacterial sinus infection is more likely. Will try a course of antibiotics and discontinue the steroids.

## 2023-12-13 DIAGNOSIS — J20.9 ACUTE BRONCHITIS, UNSPECIFIED ORGANISM: Primary | ICD-10-CM

## 2023-12-13 RX ORDER — AMOXICILLIN AND CLAVULANATE POTASSIUM 875; 125 MG/1; MG/1
1 TABLET, FILM COATED ORAL EVERY 12 HOURS SCHEDULED
Qty: 14 TABLET | Refills: 0 | Status: SHIPPED | OUTPATIENT
Start: 2023-12-13 | End: 2023-12-20

## 2023-12-13 NOTE — PROGRESS NOTES
Pt calling stating his pharmacy does not have the Augmentin extended release but they do have the regular tablets. They told Genie Carcamo that they would need a new script to be sent in order for him to pick it up. Geine Carcamo states he would like to know if that's okay by Dr. Raphael Murdock that it is not extended release and would like this done asap as he needs to start taking this medication. Spoke with Dr. Raphael Murdock via [employer] and he stated Genie Carcamo can have the Augmentin 875/125 twice daily for a week. He will send new script to [facility].

## 2024-02-01 DIAGNOSIS — J45.40 MODERATE PERSISTENT ASTHMA WITHOUT COMPLICATION: ICD-10-CM

## 2024-02-01 RX ORDER — FLUTICASONE PROPIONATE AND SALMETEROL 232; 14 UG/1; UG/1
1 POWDER, METERED RESPIRATORY (INHALATION) 2 TIMES DAILY
Qty: 3 EACH | Refills: 1 | Status: SHIPPED | OUTPATIENT
Start: 2024-02-01 | End: 2024-03-02

## 2024-02-01 NOTE — TELEPHONE ENCOUNTER
Reason for call:   [x] Refill   [] Prior Auth  [] Other:     Office:   [] PCP/Provider -   [x] Specialty/Provider - Dr dariana dugan    Medication:   Fluticasone-salmeterol 232/14, 1 puff 2x daily    [facility], [place]    Does the patient have enough for 3 days?   [] Yes   [x] No - Send as HP to POD'

## 2024-05-09 DIAGNOSIS — J45.40 MODERATE PERSISTENT ASTHMA WITHOUT COMPLICATION: ICD-10-CM

## 2024-05-09 RX ORDER — MONTELUKAST SODIUM 10 MG/1
10 TABLET ORAL
Qty: 90 TABLET | Refills: 1 | Status: SHIPPED | OUTPATIENT
Start: 2024-05-09 | End: 2024-11-05

## 2024-05-09 NOTE — TELEPHONE ENCOUNTER
Reason for call:   [x] Refill   [] Prior Auth  [] Other:     Office:   [] PCP/Provider -   [x] Specialty/Provider - Pulm / Lb Mai,      Medication: montelukast (SINGULAIR) 10 mg tablet     Dose/Frequency: Take 1 tablet (10 mg total) by mouth daily at bedtime     Quantity: 90 + 1 refill    Pharmacy: [id number]  DORIS Munoz  [address]     Does the patient have enough for 3 days?   [x] Yes   [] No - Send as HP to POD

## 2024-05-24 ENCOUNTER — TELEPHONE (OUTPATIENT)
Dept: PULMONOLOGY | Facility: CLINIC | Age: 74
End: 2024-05-24

## 2024-05-24 NOTE — TELEPHONE ENCOUNTER
Called patient to schedule appointment  for the 6M FU from [facility] and left a voicemail message.

## 2024-06-05 ENCOUNTER — OFFICE VISIT (OUTPATIENT)
Dept: PULMONOLOGY | Facility: CLINIC | Age: 74
End: 2024-06-05
Payer: MEDICARE

## 2024-06-05 VITALS
HEIGHT: 69 IN | SYSTOLIC BLOOD PRESSURE: 112 MMHG | OXYGEN SATURATION: 96 % | WEIGHT: 217 LBS | DIASTOLIC BLOOD PRESSURE: 80 MMHG | HEART RATE: 83 BPM | TEMPERATURE: 98.6 F | BODY MASS INDEX: 32.14 KG/M2

## 2024-06-05 DIAGNOSIS — J45.40 MODERATE PERSISTENT ASTHMA WITHOUT COMPLICATION: Primary | ICD-10-CM

## 2024-06-05 DIAGNOSIS — E66.09 CLASS 1 OBESITY DUE TO EXCESS CALORIES WITHOUT SERIOUS COMORBIDITY WITH BODY MASS INDEX (BMI) OF 32.0 TO 32.9 IN ADULT: ICD-10-CM

## 2024-06-05 DIAGNOSIS — J30.89 NON-SEASONAL ALLERGIC RHINITIS, UNSPECIFIED TRIGGER: ICD-10-CM

## 2024-06-05 DIAGNOSIS — I27.82 CHRONIC PULMONARY EMBOLISM WITHOUT ACUTE COR PULMONALE, UNSPECIFIED PULMONARY EMBOLISM TYPE (HCC): ICD-10-CM

## 2024-06-05 DIAGNOSIS — D68.51 FACTOR V LEIDEN MUTATION (HCC): Chronic | ICD-10-CM

## 2024-06-05 DIAGNOSIS — I82.4Y3 DEEP VEIN THROMBOSIS (DVT) OF PROXIMAL VEIN OF BOTH LOWER EXTREMITIES, UNSPECIFIED CHRONICITY (HCC): ICD-10-CM

## 2024-06-05 PROCEDURE — 99213 OFFICE O/P EST LOW 20 MIN: CPT | Performed by: INTERNAL MEDICINE

## 2024-06-05 RX ORDER — FLUTICASONE PROPIONATE AND SALMETEROL 100; 50 UG/1; UG/1
1 POWDER RESPIRATORY (INHALATION) 2 TIMES DAILY
COMMUNITY

## 2024-06-05 NOTE — PROGRESS NOTES
Pulmonary Follow Up Note   James Colin 74 y.o. male MRN: [id number]  6/5/2024      Assessment:    Bronchial asthma  - Has not needed rescue albuterol since starting an ICS/LABA  - Previously on Breo, but that became too expensive. Transitioned to Advair with spacer which was also costly. Most recently on Airduo  - Spirometry with ratio 74%, FEV1 86% without bronchodilator response.   - Maximum peripheral eosinophilia 0.69 Thou/microL    - Had an upper respiratory infection treated with outpatient steroids and antibiotics this winter. Has recovered well from this.     Allergic rhinitis  - Saline rinses PRN  - Hasn't been using flonase, but has developed some rhinorrhea in the last few weeks and is open to re-initiating this medication     History of PE  - On eliquis   - Had been taken off AC, but d-dimer was elevated so this was restarted  - Factor 5 leiden + reportedly     Stage II Follicular lymphoma  - Diagnosed 1/2021  - Follows with oncology at [facility]     Grade I diastolic dysfunction  - On echo 9/17/22, uncertain contribution to dypnea     Obesity, BMI 35.44  - Discussed need for increased activity and portion control        Plan:  - AirDuo 232/14 BID  - Albuterol PRN   - Continue Singulair  - Continue flonase        No follow-ups on file.    History of Present Illness   HPI:  James Colin is a 74 y.o. male who presents in routine follow up for his respiratory disease. He did get COVID as did his wife in March of this year. They received paxlovid and recovered back to baseline without issue. Since then he has felt well with no lingering effects. He has not had any exacerbations of his breathing. No health care visits or hospitalizations recently. He has not needed his albuterol rescue inhaler at all recently. He states that since starting the ICS/LABA that he has not used the albuterol. He does rinse his mouth after each dose and has no oral rash or pain. He does note some rhinorrhea in the last few  weeks. He has started doing nasal saline rinses again, but has not used his flonase.     Review of Systems    Historical Information   Past Medical History:   Diagnosis Date    Allergic rhinitis     Arthritis     COPD, mild (HCC)     GERD (gastroesophageal reflux disease)     History of DVT (deep vein thrombosis)     Hypertension     Kidney stones     Nephrolithiasis     Obesity (BMI 30-39.9)     Osteoarthritis     PE (pulmonary embolism) 1999    Pneumonia 2016    Prostate hypertrophy     Rib pain     7,8,9 ribs on Left chest wall with Lsided hernia    Wears glasses     reading     Past Surgical History:   Procedure Laterality Date    CT NEEDLE BIOPSY RETROPERITONEUM  2021    HERNIA REPAIR      b/l inguinal hernia with mesh    KIDNEY STONE SURGERY      KNEE ARTHROPLASTY Left     TKR    MS ARTHRS KNEE DEBRIDEMENT/SHAVING ARTCLR CRTLG Right 2016    Procedure: ARTHROSCOPY KNEE WITH PARTIAL MEDIAL MENISECTOMY;  Surgeon: Lamont Miller MD;  Location: [facility] OR;  Service: Orthopedics     Family History   Problem Relation Age of Onset    Heart disease Mother     Cancer Mother     Cancer Father      Social History     Tobacco Use   Smoking Status Former    Current packs/day: 0.00    Average packs/day: 1 pack/day for 20.0 years (20.0 ttl pk-yrs)    Types: Cigarettes    Start date:     Quit date:     Years since quittin.4   Smokeless Tobacco Never       Meds/Allergies     Current Outpatient Medications:     Albuterol Sulfate (ProAir RespiClick) 108 (90 Base) MCG/ACT AEPB, Inhale 1 puff 4 (four) times a day as needed (Wheezing), Disp: 1 each, Rfl: 5    amLODIPine (NORVASC) 2.5 mg tablet, 2.5 mg And a 5mg tablet , Disp: , Rfl:     apixaban (ELIQUIS) 5 mg, 10 mg orally twice daily for 7 days and then 5 mg orally twice daily. (Patient taking differently: 2.5 mg 2 (two) times a day), Disp: 28 tablet, Rfl: 0    finasteride (PROSCAR) 5 mg tablet, Take 5 mg by mouth daily., Disp: , Rfl:      "fluticasone (FLONASE) 50 mcg/act nasal spray, 2 sprays into each nostril daily, Disp: 16 g, Rfl: 6    fluticasone-salmeterol (AirDuo RespiClick 232/14) 232-14 mcg/act dry powder inhaler, Inhale 1 puff 2 (two) times a day Rinse mouth after use., Disp: 3 each, Rfl: 1    lidocaine (XYLOCAINE) 1 %, Inject 10 mL as directed (Patient not taking: Reported on 11/15/2023), Disp: , Rfl:     losartan (COZAAR) 100 MG tablet, Take 1 tablet (100 mg total) by mouth daily, Disp: 90 tablet, Rfl: 1    meloxicam (MOBIC) 15 mg tablet, , Disp: , Rfl:     montelukast (SINGULAIR) 10 mg tablet, Take 1 tablet (10 mg total) by mouth daily at bedtime, Disp: 90 tablet, Rfl: 1    nirmatrelvir & ritonavir (Paxlovid, 300/100,) tablet therapy pack, Paxlovid 300 mg (150 mg x 2)-100 mg tablets in a dose pack (Patient not taking: Reported on 11/15/2023), Disp: , Rfl:     rosuvastatin (CRESTOR) 5 mg tablet, , Disp: , Rfl:     tadalafil (CIALIS) 20 MG tablet, Take 20 mg by mouth as needed, Disp: , Rfl:     tamsulosin (FLOMAX) 0.4 mg, tamsulosin 0.4 mg capsule, Disp: , Rfl:     traZODone (DESYREL) 50 mg tablet, , Disp: , Rfl:   Allergies   Allergen Reactions    Other Cough    Pollen Extract Cough       Vitals: There were no vitals taken for this visit. There is no height or weight on file to calculate BMI.        Physical Exam  Physical Exam    Labs: I have personally reviewed pertinent lab results.  Lab Results   Component Value Date    WBC 9.70 03/22/2019    HGB 16.0 03/22/2019    HCT 48.7 03/22/2019    MCV 96 03/22/2019     03/22/2019     Lab Results   Component Value Date    GLUCOSE 101 12/26/2015    CALCIUM 10.4 (H) 06/04/2024     12/26/2015    K 4.6 06/04/2024    CO2 25 06/04/2024     06/04/2024    BUN 18 06/04/2024    CREATININE 1.03 06/04/2024     No results found for: \"IGE\"  Lab Results   Component Value Date    ALT 15 05/19/2024    AST 21 05/19/2024    ALKPHOS 95 05/19/2024    BILITOT 0.45 12/26/2015         Preventive " "  Influenza vaccine: Obtained this year - 11/3/23  COVID-19 vaccination: Pfizer x 4 with last dose 11/3/23  Pneumonia vaccine: PCV13 8/9/21, PPSV20 10/13/22  RSV: Plans to obtain  Lung Cancer screening: N/A      Imaging and other studies: I have personally reviewed pertinent films in PACS  CT chest/abd/pelvis 11/29/22:  1. Stable examination of the chest, abdomen and pelvis with no pathologically   enlarged lymph node.   2. Pulmonary nodule in left lower lobe measuring 7 mm is stable.   3. Cholelithiasis.   4. Bilateral nephrolithiasis.   5. Colonic diverticulosis. Additional findings as detailed above.       Pulmonary function testing: Prior in-office spirometry has been reported as normal    EKG, Pathology, and Other Studies: I have personally reviewed pertinent reports.    TTE 9/17/22:    Left Ventricle: Left ventricular cavity size is normal. Wall thickness is mildly increased. The left ventricular ejection fraction is 60%. Systolic function is normal. Wall motion is normal. Diastolic function is mildly abnormal, consistent with grade I (abnormal) relaxation.    Right Ventricle: Right ventricular cavity size is normal. Systolic function is normal.    Mitral Valve: There is mild annular calcification.    Tricuspid Valve: There is mild regurgitation.    Disclaimer: Portions of the record may have been created with voice recognition software. Occasional wrong word or \"sound a like\" substitutions may have occurred due to the inherent limitations of voice recognition software. Careful consideration should be taken to recognize, using context, where substitutions have occurred.    Lb Peters, DO   Pulmonary & Critical Care Medicine Fellow PGY-V  [facility]   "

## 2024-11-19 DIAGNOSIS — J45.40 MODERATE PERSISTENT ASTHMA WITHOUT COMPLICATION: ICD-10-CM

## 2024-11-19 RX ORDER — MONTELUKAST SODIUM 10 MG/1
10 TABLET ORAL
Qty: 90 TABLET | Refills: 0 | Status: SHIPPED | OUTPATIENT
Start: 2024-11-19 | End: 2025-05-18

## 2024-11-19 NOTE — TELEPHONE ENCOUNTER
Reason for call:   [x] Refill   [] Prior Auth  [] Other:     Office:   [] PCP/Provider -   [x] Specialty/Provider - [facility] / Lb Mai,      Medication:   montelukast (SINGULAIR) 10 mg tablet - Take 1 tablet (10 mg total) by mouth daily at bedtime     Pharmacy:   [id number] - [place] - [address]     Does the patient have enough for 3 days?   [x] Yes   [] No - Send as HP to POD

## 2024-12-09 ENCOUNTER — OFFICE VISIT (OUTPATIENT)
Dept: PULMONOLOGY | Facility: CLINIC | Age: 74
End: 2024-12-09
Payer: MEDICARE

## 2024-12-09 VITALS
SYSTOLIC BLOOD PRESSURE: 120 MMHG | TEMPERATURE: 97.6 F | HEART RATE: 77 BPM | OXYGEN SATURATION: 98 % | HEIGHT: 69 IN | BODY MASS INDEX: 34.51 KG/M2 | WEIGHT: 233 LBS | DIASTOLIC BLOOD PRESSURE: 84 MMHG

## 2024-12-09 DIAGNOSIS — J30.89 ALLERGIC RHINITIS DUE TO OTHER ALLERGIC TRIGGER, UNSPECIFIED SEASONALITY: ICD-10-CM

## 2024-12-09 DIAGNOSIS — J45.40 MODERATE PERSISTENT ASTHMA WITHOUT COMPLICATION: Primary | ICD-10-CM

## 2024-12-09 DIAGNOSIS — I27.82 CHRONIC PULMONARY EMBOLISM WITHOUT ACUTE COR PULMONALE, UNSPECIFIED PULMONARY EMBOLISM TYPE (HCC): ICD-10-CM

## 2024-12-09 DIAGNOSIS — D68.51 FACTOR V LEIDEN MUTATION (HCC): Chronic | ICD-10-CM

## 2024-12-09 PROCEDURE — G2211 COMPLEX E/M VISIT ADD ON: HCPCS | Performed by: INTERNAL MEDICINE

## 2024-12-09 PROCEDURE — 99214 OFFICE O/P EST MOD 30 MIN: CPT | Performed by: INTERNAL MEDICINE

## 2024-12-09 NOTE — ASSESSMENT & PLAN NOTE
- Has not needed rescue albuterol since starting an ICS/LABA  - Stable with AirDuo daily one puff BID   - Continue current regimen  - F/u 6 months

## 2024-12-09 NOTE — PROGRESS NOTES
"Office Progress Note - Pulmonary    James Colin 74 y.o. male MRN: [id number]    Encounter: [id number]      Assessment & Plan:     Allergic rhinitis  - Uses Flonase and Claritin prn  - Patient stopped his Singulair, advised patient he may want to restart during allergy season    PE (pulmonary embolism)  - On eliquis daily    Moderate persistent asthma without complication  - Has not needed rescue albuterol since starting an ICS/LABA  - Stable with AirDuo daily one puff BID   - Continue current regimen  - F/u 6 months            Subjective:   James Colin is a 74 y.o. male who presents in routine follow up for his respiratory disease. He states last weekend he woke up in the middle of the night with coughing and mucus. He states he was unable to clear his chest. He took a Claritin and within 30-45 min noticed improvement in sx. He has been ok since and says his only flare up of sx occur similarly and resolve with Claritin. He is compliant with his AirDuo twice a day. He does rinse his mouth after each dose and has no oral rash or pain. He states he has only had to use his rescue inhaler once this past year. He is using Flonase spray but discontinued his Singulair. He states he read in the paper that Singulair can cause \"insanity\" so he discontinued the medication one month ago. He is not using Claritin daily only as needed.    Patient was in the ED for chest pain in November but has had no hospitalizations for respiratory issues since last office visit.     Review of systems:  A 12 point system review is done and aside from what is stated above the rest of the review of systems is negative.      Family history and social history are reviewed.    Medications list is reviewed.      Vitals: Blood pressure 120/84, pulse 77, temperature 97.6 °F (36.4 °C), temperature source Tympanic, height 5' 9\" (1.753 m), weight 106 kg (233 lb), SpO2 98%.,     Physical Exam  Gen: Awake, alert, oriented x 3, no acute " distress  HEENT: Mucous membranes moist, no oral lesions, no thrush  NECK: No accessory muscle use, JVP not elevated  Cardiac: Regular, single S1, single S2, no murmurs, no rubs, no gallops  Lungs: Clear to auscultation b/l, no wheezing   Abdomen: normoactive bowel sounds  Extremities: no cyanosis, no clubbing, no edema  Neuro:  Grossly nonfocal.  Skin:  No rash.        Avis Davis MD   PGY-2, Family Medicine  [facility]

## 2024-12-09 NOTE — ASSESSMENT & PLAN NOTE
- Uses Flonase and Claritin prn  - Patient stopped his Singulair, advised patient he may want to restart during allergy season

## 2024-12-23 DIAGNOSIS — J45.40 MODERATE PERSISTENT ASTHMA WITHOUT COMPLICATION: ICD-10-CM

## 2024-12-23 NOTE — TELEPHONE ENCOUNTER
Reason for call:   [x] Refill   [] Prior Auth  [] Other:     Office:   [] PCP/Provider -   [x] Specialty/Provider - Pulm     Medication: fluticasone-salmeterol  232-14 mcg    [facility]:   [facility] [place]    Does the patient have enough for 3 days?   [x] Yes   [] No - Send as HP to POD

## 2024-12-24 RX ORDER — FLUTICASONE PROPIONATE AND SALMETEROL 232; 14 UG/1; UG/1
1 POWDER, METERED RESPIRATORY (INHALATION) 2 TIMES DAILY
Qty: 3 EACH | Refills: 1 | Status: SHIPPED | OUTPATIENT
Start: 2024-12-24 | End: 2025-01-23

## 2025-03-21 ENCOUNTER — TELEPHONE (OUTPATIENT)
Dept: PULMONOLOGY | Facility: CLINIC | Age: 75
End: 2025-03-21

## 2025-03-21 DIAGNOSIS — J30.89 SEASONAL ALLERGIC RHINITIS DUE TO OTHER ALLERGIC TRIGGER: ICD-10-CM

## 2025-03-21 DIAGNOSIS — J30.89 ALLERGIC RHINITIS DUE TO OTHER ALLERGIC TRIGGER, UNSPECIFIED SEASONALITY: Primary | ICD-10-CM

## 2025-03-21 DIAGNOSIS — J45.40 MODERATE PERSISTENT ASTHMA WITHOUT COMPLICATION: ICD-10-CM

## 2025-03-21 RX ORDER — MONTELUKAST SODIUM 10 MG/1
10 TABLET ORAL
Qty: 90 TABLET | Refills: 3 | Status: SHIPPED | OUTPATIENT
Start: 2025-03-21

## 2025-03-21 RX ORDER — FLUTICASONE PROPIONATE 50 MCG
2 SPRAY, SUSPENSION (ML) NASAL DAILY
Qty: 16 G | Refills: 5 | Status: SHIPPED | OUTPATIENT
Start: 2025-03-21

## 2025-03-21 NOTE — TELEPHONE ENCOUNTER
Patient called the RX Refill Line. Message is being forwarded to the office.     Patient is requesting refill on Montelukast 10 mg take 1 tablet at bedtime. Not on active medication list  [facility] [address] [id number]  Phone: [phone]  Fax: [phone]     Any questions Please contact patient at [phone]

## 2025-03-21 NOTE — TELEPHONE ENCOUNTER
Reason for call:   [x] Refill   [] Prior Auth  [] Other:     Office:   [] PCP/Provider -   [x] Specialty/Provider - Yang Moody    Medication: Fluticasone nasal spray    Dose/Frequency: 50 mcg 2 sprays Daily     Quantity: 16 g    Pharmacy: [address]    Local Pharmacy   Does the patient have enough for 3 days?   [x] Yes   [] No - Send as HP to POD    Mail Away Pharmacy   Does the patient have enough for 10 days?   [] Yes   [] No - Send as HP to POD

## 2025-03-27 ENCOUNTER — TELEPHONE (OUTPATIENT)
Dept: PULMONOLOGY | Facility: CLINIC | Age: 75
End: 2025-03-27

## 2025-03-27 NOTE — TELEPHONE ENCOUNTER
LVM for patient in regards to scheduling a 6m fu around 6/9/2025 with . Please offer appts both in the [place] and [place] office. If patient only wants [place] then offer what's available in [place]'s schedule then if that doesn't work for patient then offer a fellow in the [place] office.

## 2025-03-28 ENCOUNTER — OFFICE VISIT (OUTPATIENT)
Dept: PULMONOLOGY | Facility: CLINIC | Age: 75
End: 2025-03-28
Payer: MEDICARE

## 2025-03-28 VITALS
HEART RATE: 97 BPM | BODY MASS INDEX: 35.25 KG/M2 | HEIGHT: 69 IN | DIASTOLIC BLOOD PRESSURE: 76 MMHG | OXYGEN SATURATION: 95 % | SYSTOLIC BLOOD PRESSURE: 134 MMHG | TEMPERATURE: 98.6 F | WEIGHT: 238 LBS

## 2025-03-28 DIAGNOSIS — J01.40 ACUTE NON-RECURRENT PANSINUSITIS: ICD-10-CM

## 2025-03-28 DIAGNOSIS — R05.1 ACUTE COUGH: ICD-10-CM

## 2025-03-28 DIAGNOSIS — J45.40 MODERATE PERSISTENT ASTHMA WITHOUT COMPLICATION: Primary | ICD-10-CM

## 2025-03-28 DIAGNOSIS — J30.89 ALLERGIC RHINITIS DUE TO OTHER ALLERGIC TRIGGER, UNSPECIFIED SEASONALITY: ICD-10-CM

## 2025-03-28 PROCEDURE — G2211 COMPLEX E/M VISIT ADD ON: HCPCS | Performed by: INTERNAL MEDICINE

## 2025-03-28 PROCEDURE — 99214 OFFICE O/P EST MOD 30 MIN: CPT | Performed by: INTERNAL MEDICINE

## 2025-03-28 RX ORDER — BENZONATATE 200 MG/1
200 CAPSULE ORAL 3 TIMES DAILY PRN
Qty: 30 CAPSULE | Refills: 1 | Status: SHIPPED | OUTPATIENT
Start: 2025-03-28

## 2025-03-28 RX ORDER — FLUTICASONE PROPIONATE AND SALMETEROL 232; 14 UG/1; UG/1
1 POWDER, METERED RESPIRATORY (INHALATION) 2 TIMES DAILY
Qty: 1 EACH | Refills: 11 | Status: SHIPPED | OUTPATIENT
Start: 2025-03-28 | End: 2025-04-27

## 2025-03-28 RX ORDER — PREDNISONE 20 MG/1
TABLET ORAL
COMMUNITY
Start: 2025-02-24 | End: 2025-03-28 | Stop reason: ALTCHOICE

## 2025-03-28 RX ORDER — METOPROLOL TARTRATE 50 MG
50 TABLET ORAL
COMMUNITY
Start: 2025-01-23 | End: 2025-03-28 | Stop reason: ALTCHOICE

## 2025-03-28 RX ORDER — PREDNISONE 10 MG/1
10 TABLET ORAL SEE ADMIN INSTRUCTIONS
Qty: 21 TABLET | Refills: 0 | Status: SHIPPED | OUTPATIENT
Start: 2025-03-28

## 2025-03-28 RX ORDER — HYDROCHLOROTHIAZIDE 12.5 MG/1
12.5 TABLET ORAL DAILY
COMMUNITY
Start: 2025-01-29 | End: 2026-01-29

## 2025-03-28 NOTE — PROGRESS NOTES
Follow-up  Visit - Pulmonary Medicine   Name: James Colin      : 1950      MRN: [id number]  Encounter Provider: Carlos Montoya MD  Encounter Date: 3/28/2025   [facility]  :  Assessment & Plan  Moderate persistent asthma without complication  Currently has some increased wheezing, possible mild exacerbation  Will add prednisone 20 mg for 1 week followed by 10 mg for 1 week  Continue air duo 232/14  Continue montelukast  Continue albuterol rescue inhaler    Orders:    fluticasone-salmeterol (AIRDUO RESPICLICK) 232-14 mcg/act dry powder inhaler; Inhale 1 puff 2 (two) times a day Rinse mouth after use.    Allergic rhinitis due to other allergic trigger, unspecified seasonality  Continue montelukast and antihistamine  Continue Flonase         Acute non-recurrent pansinusitis  I do think the current trigger has a lot to do with a component of sinusitis.  He has sinus pressure, drainage, and yellowish discharge.  No fever and no headache however.  Will give course of Augmentin    Orders:    predniSONE 10 mg tablet; Take 1 tablet (10 mg total) by mouth see administration instructions 2 tablet daily for 1 week then 1 tablet daily for 1 week    amoxicillin-clavulanate (AUGMENTIN) 875-125 mg per tablet; Take 1 tablet by mouth every 12 (twelve) hours for 10 days    Acute cough  Patient has been bothered by cough  Continue Mucinex DM  Will order Tessalon to see if this helps with cough symptomatology  Orders:    benzonatate (TESSALON) 200 MG capsule; Take 1 capsule (200 mg total) by mouth 3 (three) times a day as needed for cough      No follow-ups on file.    History of Present Illness   James Colin is a 74 y.o. male who presents for a sick visit.  He has had increased cough and phlegm for more than a couple weeks now.  He has been using over-the-counter remedies.  He has had home testing for flu, COVID and RSV that has been negative.  He has not been  "running a fever but has had significant sinus congestion, postnasal drip, and a harsh cough.  He also has significant sinus pressure and yellowish nasal discharge and yellowish phlegm.  He has not had any chest pain but he has been having difficulty sleeping due to the severity of the cough.  He has been using his air duo inhaler and his albuterol but still has had substantial wheezing.  He did get a 5-day course of prednisone from the emergency department but this was only of limited benefit and symptoms recurred    Review of systems:  Aside from what is mentioned in the HPI, ROS is otherwise negative         Medical History Reviewed by provider this encounter:  Tobacco  Allergies  Meds  Problems  Med Hx  Surg Hx  Fam Hx     .    Objective   /76   Pulse 97   Temp 98.6 °F (37 °C) (Tympanic)   Ht 5' 9\" (1.753 m)   Wt 108 kg (238 lb)   SpO2 95%   BMI 35.15 kg/m²     Physical Exam:   Gen:  Comfortable on room air.  No conversational dyspnea  HEENT:  Conjugate gaze.  sclerae anicteric.  Oropharynx moist.  Sounds very nasally congested and has significant erythema and edema in the nares  Neck: Trachea is midline. No JVD. No adenopathy  Chest: Excursion symmetric but he does have expiratory wheezing bilaterally  Cardiac: Regular. no murmur  Abdomen:  benign  Extremities: No edema  Neuro:  Normal speech and mentation    Diagnostic Data:    Radiology results:    Recent radiology reports from [facility] were reviewed.  He had a CAT scan of his chest abdomen and pelvis which did not show any significant airspace opacity or pulmonary nodules.  A CT coronary angiogram from March 18  described no significant pulmonary pathology and visualized lung carrington.      Carlos Montoya MD      "

## 2025-03-28 NOTE — ASSESSMENT & PLAN NOTE
I do think the current trigger has a lot to do with a component of sinusitis.  He has sinus pressure, drainage, and yellowish discharge.  No fever and no headache however.  Will give course of Augmentin    Orders:    predniSONE 10 mg tablet; Take 1 tablet (10 mg total) by mouth see administration instructions 2 tablet daily for 1 week then 1 tablet daily for 1 week    amoxicillin-clavulanate (AUGMENTIN) 875-125 mg per tablet; Take 1 tablet by mouth every 12 (twelve) hours for 10 days

## 2025-04-27 PROBLEM — J01.40 ACUTE NON-RECURRENT PANSINUSITIS: Status: RESOLVED | Noted: 2025-03-28 | Resolved: 2025-04-27

## 2025-06-04 PROCEDURE — 88305 TISSUE EXAM BY PATHOLOGIST: CPT | Performed by: PATHOLOGY

## 2025-06-10 PROCEDURE — 88305 TISSUE EXAM BY PATHOLOGIST: CPT | Performed by: PATHOLOGY

## 2025-07-07 ENCOUNTER — TELEPHONE (OUTPATIENT)
Dept: PULMONOLOGY | Facility: CLINIC | Age: 75
End: 2025-07-07

## 2025-07-10 NOTE — TELEPHONE ENCOUNTER
Call pt back to follow up on S/S. Pt refused to give identifiers. Advised pt I can't give out pt information unless I know I have the correct pt on the phone due to pt confidentiality. Pt refused. Advised pt he can call the office back he would like, pt stated fine. Call concluded.  
Called patient to schedule 6M FU with  in the [place] office. Patients wife answered the phone and expressed that the patient would need to be seen sooner due to some symptoms of COVID. I checked the surrounding offices to see if there were much sooner appts with any provider. The first one available was for 7/11 with  in [place]. Patients wife stated that would not be appropriate as patient might end up in hospital before then. I restated to the wife that this was the first available appt I had at this time. The wife agreed to the appt and asked that we reach out if there is something sooner. I proceeded to inform the wife that if any of his symptoms progress to give us a call or seek medical attention at the ER. The wife hung up the phone while trying to relay this information.   
Patient  canceled 7/11/2025 appointment and rescheduled for Dr. Montoya / September   
No